# Patient Record
Sex: FEMALE | Race: ASIAN | NOT HISPANIC OR LATINO | Employment: UNEMPLOYED | URBAN - METROPOLITAN AREA
[De-identification: names, ages, dates, MRNs, and addresses within clinical notes are randomized per-mention and may not be internally consistent; named-entity substitution may affect disease eponyms.]

---

## 2019-11-23 ENCOUNTER — OFFICE VISIT (OUTPATIENT)
Dept: PEDIATRICS | Facility: CLINIC | Age: 29
End: 2019-11-23
Payer: COMMERCIAL

## 2019-11-23 VITALS
DIASTOLIC BLOOD PRESSURE: 66 MMHG | WEIGHT: 121.6 LBS | SYSTOLIC BLOOD PRESSURE: 98 MMHG | HEART RATE: 83 BPM | OXYGEN SATURATION: 100 % | TEMPERATURE: 98.3 F | BODY MASS INDEX: 22.96 KG/M2 | HEIGHT: 61 IN

## 2019-11-23 DIAGNOSIS — J30.89 SEASONAL ALLERGIC RHINITIS DUE TO OTHER ALLERGIC TRIGGER: ICD-10-CM

## 2019-11-23 DIAGNOSIS — M62.838 NECK MUSCLE SPASM: ICD-10-CM

## 2019-11-23 DIAGNOSIS — G44.219 EPISODIC TENSION-TYPE HEADACHE, NOT INTRACTABLE: Primary | ICD-10-CM

## 2019-11-23 PROCEDURE — 99204 OFFICE O/P NEW MOD 45 MIN: CPT | Performed by: FAMILY MEDICINE

## 2019-11-23 RX ORDER — TIZANIDINE 2 MG/1
2 TABLET ORAL
Qty: 30 TABLET | Refills: 0 | Status: SHIPPED | OUTPATIENT
Start: 2019-11-23 | End: 2021-06-03

## 2019-11-23 RX ORDER — LORATADINE 10 MG/1
10 TABLET ORAL DAILY
Qty: 30 TABLET | Refills: 3 | Status: SHIPPED | OUTPATIENT
Start: 2019-11-23 | End: 2020-10-07

## 2019-11-23 RX ORDER — FLUTICASONE PROPIONATE 50 MCG
2 SPRAY, SUSPENSION (ML) NASAL DAILY
Qty: 16 G | Refills: 1 | Status: SHIPPED | OUTPATIENT
Start: 2019-11-23 | End: 2020-10-07

## 2019-11-23 ASSESSMENT — MIFFLIN-ST. JEOR: SCORE: 1222.91

## 2019-11-23 NOTE — PATIENT INSTRUCTIONS
Schedule for physical, fasting labs in 1-2 months  Take medications as given today  Sign up for my chart  [facility] for [facility]      Patient Education     Neck Exercises: Neck and Torso Rotation   To start, lie on your back, knees bent and feet flat on the floor. Keep your ears, shoulders, and hips aligned, but don t press your lower back to the floor. Breathe deeply and relax.    From starting position, drop both knees to one side. At the same time, turn your head and look in the other direction.    Keep both feet in contact with the floor and keep your arms at your sides.    Hold for 5 seconds. Then slowly switch sides.    Repeat 5 to 10 times.  Date Last Reviewed: 3/1/2018    6264-1994 [employer]. [address] [place] [address]. All rights reserved. This information is not intended as a substitute for professional medical care. Always follow your healthcare professional's instructions.           Patient Education     Neck Exercises: Neck Flex  To start, sit in a chair with your feet flat on the floor. Your weight should be slightly forward so that you re balanced evenly on your buttocks. Relax your shoulders and keep your head level. Avoid arching your back or rounding your shoulders. Using a chair with arms may help you keep your balance:    Rest the back of your left hand against your lower back. Place your right palm on the top of your head.    Gently pull your head forward and down until you feel a stretch in the muscles in the back of your neck. Don t force the motion.    Hold for 20 seconds, then return to starting position. Switch arms.    Repeat 5 to 10 times.    Date Last Reviewed: 3/1/2018    3897-5299 [employer]. [address] [place] [id number]. All rights reserved. This information is not intended as a substitute for professional medical care. Always follow your healthcare professional's instructions.           Patient Education     Neck Exercises:  Passive Neck Rotation    To start, lie on your back, knees bent and feet flat on the floor. Keep your ears, shoulders, and hips aligned, but don t press your lower back to the floor. Rest your hands on your pelvis. Breathe deeply and relax.  Here are the steps for passive neck rotation:     With your neck relaxed, place the palm of one hand on your forehead. Use your hand to turn your head to one side until you feel a stretch in the neck muscles. Do not push through pain.    Hold for 5 seconds. Then turn to the other side.    Repeat 5 times on each side.   Note: Keep your shoulders on the floor. Don t lift your chin as you turn your head.  Date Last Reviewed: 11/1/2017 2000-2018 [employer]. [address] [place] [address]. All rights reserved. This information is not intended as a substitute for professional medical care. Always follow your healthcare professional's instructions.           Patient Education     Neck Exercises: Active Neck Rotation    To start, lie on your back, knees bent and feet flat on the floor. Keep your ears, shoulders, and hips aligned, but don t press your lower back to the floor. Rest your hands on your pelvis. Breathe deeply and relax.  Here are the steps for the active neck rotation:    Use your neck muscles to turn your head to one side until you feel a stretch in the muscles.    Hold for 5 seconds. Then turn to the other side.    Repeat 5 times on each side.  Note: Keep your shoulders on the floor. Don t lift or tuck your chin as you turn your head.  Date Last Reviewed: 11/1/2017 2000-2018 [employer]. [address] [place] [address]. All rights reserved. This information is not intended as a substitute for professional medical care. Always follow your healthcare professional's instructions.           Patient Education     Shoulder Squeeze Exercise    To start, sit in a chair with your feet flat on the floor. Shift your weight  slightly forward to avoid rounding your back. Relax. Keep your ears, shoulders, and hips aligned:    Raise your arms to shoulder height, elbows bent and palms forward.    Move your arms back, squeezing your shoulder blades together.    Hold for 10 seconds. Return to starting position.     Repeat 5 times.   For your safety, check with your healthcare provider before starting an exercise program.   Date Last Reviewed: 11/1/2017 2000-2018 [employer]. [address] [place] [address]. All rights reserved. This information is not intended as a substitute for professional medical care. Always follow your healthcare professional's instructions.           Patient Education     Shoulder Shrug Exercise    To start, sit in a chair with your feet flat on the floor. Shift your weight slightly forward to avoid rounding your back. Relax. Keep your ears, shoulders, and hips aligned:    Raise both of your shoulders as high as you can, as if you were trying to touch them to your ears. Keep your head and neck still and relaxed.    Hold for a count of 10. Release.    Repeat 5 times.  For your safety, check with your healthcare provider before starting an exercise program.   Date Last Reviewed: 11/1/2017 2000-2018 [employer]. [address] [place] [address]. All rights reserved. This information is not intended as a substitute for professional medical care. Always follow your healthcare professional's instructions.           Patient Education     Shoulder Exercises    To start, sit in a chair with your feet flat on the floor. Your weight should be slightly forward so that you re balanced evenly on your buttocks. Relax your shoulders and keep your head level. Avoid arching your back or rounding your shoulders. Using a chair with arms may help you keep your balance.    Raise your arms, elbows bent, to shoulder height.    Slowly move your forearms together. Hold  for 5 seconds.    Return to starting position. Repeat 5 times.  Date Last Reviewed: 3/1/2018    4114-6738 [employer]. [address] [place] [address]. All rights reserved. This information is not intended as a substitute for professional medical care. Always follow your healthcare professional's instructions.           Patient Education     Shoulder Clock Exercise    To start, stand tall with your ears, shoulders, and hips in line. Your feet should be slightly apart, positioned just under your hips. Focus your eyes directly in front of you.  this position for a few seconds before starting your exercise. This helps increase your awareness of proper posture.    Imagine that your right shoulder is the center of a clock. With the outer point of your shoulder, roll it around to slowly trace the outer edge of the clock.    Move clockwise first, then counterclockwise.    Repeat 3 to 5 times. Switch shoulders.  Date Last Reviewed: 3/1/2018    7713-8051 [employer]. [address] [place] [address]. All rights reserved. This information is not intended as a substitute for professional medical care. Always follow your healthcare professional's instructions.           Patient Education     Shoulder and Upper Back Stretch  To start, stand tall with your ears, shoulders, and hips in line. Your feet should be slightly apart, positioned just under your hips. Focus your eyes directly in front of you.  this position for a few seconds before starting your exercise. This helps increase your awareness of proper posture.          Reach overhead and slightly back with both arms. Keep your shoulders and neck aligned and your elbows behind your shoulders:    With your palms facing the ceiling, turn your fingers inward.    Take a deep breath. Breathe out, and lower your elbows toward your buttocks. Hold for 5 seconds, then return to starting position.    Repeat 3 times.  Date  Last Reviewed: 11/1/2017 2000-2018 [employer]. [address] [place] [id number]. All rights reserved. This information is not intended as a substitute for professional medical care. Always follow your healthcare professional's instructions.           Patient Education     Reach and Hold Exercise       Do this exercise on your hands and knees. Keep your knees under your hips and your hands under your shoulders. Keep your spine in a neutral position (not arched or sagging). Keep your ears in line with your shoulders. Hold for a few seconds before starting the exercise:  1. Tighten your belly muscles and raise one arm straight in front of you, palm down. Hold for 5 seconds, then lower. Repeat 5 times.  2. Do the exercise again, this time lifting your arm to the side. Repeat 5 times.  3. Do the exercise again, this time lifting your arm backward, palm up. Repeat 5 times.  Switch sides and do each exercise with the other arm.  Date Last Reviewed: 11/1/2017 2000-2018 [employer]. [address] [place] [id number]. All rights reserved. This information is not intended as a substitute for professional medical care. Always follow your healthcare professional's instructions.

## 2019-11-23 NOTE — PROGRESS NOTES
Subjective     Michael Robertson is a 29 year old female who presents to clinic today for the following health issues:    HPI   New Patient/Transfer of Care   Physical was done last November. Patient was seen at [facility] in [place]  Patient is new to the provider, is here today with her  to establish care.  Patient does not have a significant past medical history other than ongoing frequent headaches for the past 2 years as mentioned below  Headaches    Complaining of pain on the left side of the forehead, left side of the neck, both shoulder blades and upper neck intermittently for the past 2 years.    Patient recently moved from [facility] in [facility] 1/2 years ago after her marriage  She is in school now, working with computers for more than 8 hours/day  Patient also noted significant mucus nasal drainage and nasal congestion in the morning  Denies cough, postnasal drainage, sore throat, history of previous allergies, asthma, smoking  Patient has tried Zyrtec but has drowsiness if she takes the full dose of 10 mg  Takes medication only as needed for severe allergies  Patient denies family or personal history of migraines, current concerns for nausea, vomiting, photophobia, blurred or double vision, tingling, numbness or weakness of extremities.    Patient does not chew gums, does get jaw pain when she gets a headache  Patient wears contacts, last eye exam was last June  Patient does not find any correlation between her menstrual cycles and the headaches  Denies sleep problems, teeth clenching, teeth grinding at night, depression, anxiety      Duration: Has been going on for couple years on and off    Description  Location: unilateral in the left frontal area   Character: throbbing pain  Frequency:  depends  Duration:  Headache always after school in the evening or when stressing too much    Intensity:  mild    Accompanying signs and symptoms:    Precipitating or Alleviating  factors:  Nausea/vomiting: sometimes nausea  Dizziness: no  Weakness or numbness: no  Visual changes: eyes hurt  Fever: no   Sinus or URI symptoms YES    History  Head trauma: no  Family history of migraines: no  Previous tests for headaches: no  Neurologist evaluations: no  Able to do daily activities when headache present: no  Wake with headaches: no  Daily pain medication use: no but takes allergy medicine Zyrtec   Any changes in: school none    Precipitating or Alleviating factors (light/sound/sleep/caffeine): no    Therapies tried and outcome: Zyrtec    Outcome - helps sleeping  Frequent/daily pain medication use: no        Patient Active Problem List   Diagnosis     Seasonal allergic rhinitis due to other allergic trigger     Episodic tension-type headache, not intractable     Neck muscle spasm     Past Surgical History:   Procedure Laterality Date     NO HISTORY OF SURGERY         Social History     Tobacco Use     Smoking status: Never Smoker     Smokeless tobacco: Never Used   Substance Use Topics     Alcohol use: Not on file     Family History   Problem Relation Age of Onset     Diabetes Mother      Hypertension Mother      Hyperlipidemia Mother      Thyroid Cancer Father      Thyroid Disease Father      Hyperlipidemia Father      Asthma Brother      Diabetes Paternal Grandmother          Current Outpatient Medications   Medication Sig Dispense Refill     fluticasone (FLONASE) 50 MCG/ACT nasal spray Spray 2 sprays into both nostrils daily 16 g 1     loratadine (CLARITIN) 10 MG tablet Take 1 tablet (10 mg) by mouth daily 30 tablet 3     tiZANidine (ZANAFLEX) 2 MG tablet Take 1 tablet (2 mg) by mouth nightly as needed for muscle spasms 30 tablet 0     No Known Allergies  No lab results found.   BP Readings from Last 3 Encounters:   11/23/19 98/66    Wt Readings from Last 3 Encounters:   11/23/19 55.2 kg (121 lb 9.6 oz)                      Reviewed and updated as needed this visit by Provider         Review  "of Systems   ROS COMP: CONSTITUTIONAL: NEGATIVE for fever, chills, change in weight  INTEGUMENTARY/SKIN: NEGATIVE for worrisome rashes, moles or lesions  EYES: NEGATIVE for vision changes or irritation  ENT/MOUTH: as above  RESP: NEGATIVE for significant cough or SOB  CV: NEGATIVE for chest pain, palpitations or peripheral edema  GI: NEGATIVE for nausea, abdominal pain, heartburn, or change in bowel habits  MUSCULOSKELETAL: as above  NEURO: as above  ENDOCRINE: NEGATIVE for temperature intolerance, skin/hair changes  HEME/ALLERGY/IMMUNE: NEGATIVE for bleeding problems  PSYCHIATRIC: NEGATIVE for changes in mood or affect      Objective    BP 98/66 (BP Location: Right arm, Patient Position: Chair, Cuff Size: Adult Regular)   Pulse 83   Temp 98.3  F (36.8  C) (Temporal)   Ht 1.556 m (5' 1.25\")   Wt 55.2 kg (121 lb 9.6 oz)   LMP 11/07/2019 (Exact Date)   SpO2 100%   BMI 22.79 kg/m    Body mass index is 22.79 kg/m .  Physical Exam   GENERAL: healthy, alert and no distress  EYES: Eyes grossly normal to inspection  HENT: ear canals and TM's normal, nose and mouth without ulcers or lesions  NECK: no adenopathy, no asymmetry, masses, or scars and thyroid normal to palpation  RESP: lungs clear to auscultation - no rales, rhonchi or wheezes  CV: regular rate and rhythm, normal S1 S2, no S3 or S4, no murmur, click or rub, no peripheral edema and peripheral pulses strong  MS: Full range of motion of the neck, no cervical spine tenderness, moderate PARA Cervical and trapezius muscle spasm on both sides  Normal TMJ exam on both sides  SKIN: no suspicious lesions or rashes  NEURO: Normal strength and tone, mentation intact and speech normal  NEURO: speech normal, cranial nerves 2-12 intact and DTR's normal and symmetric   PSYCH: mentation appears normal, affect normal/bright    Diagnostic Test Results:  Labs reviewed in Epic        Assessment & Plan     1. Episodic tension-type headache, not intractable  Her headache is " multifactorial including a combination of tension type headaches, sinus congestion from uncontrolled untreated allergies and stress  Reviewed relaxation techniques recommended  Home rehab exercises for the neck, gave education handouts on the same  Recommended Zanaflex 2 mg at bedtime to relax the tensor neck muscles causing her pain in the forehead  Patient will follow-up if no better in 4 weeks or sooner if needed  Dosing and potential medication side effects discussed.  Will consider imaging and physical therapy  for persistent or worsening concerns  Recommended patient to schedule for her ophthalmology exam for vision screen  Patient verbalised understanding and is agreeable to the plan.    - tiZANidine (ZANAFLEX) 2 MG tablet; Take 1 tablet (2 mg) by mouth nightly as needed for muscle spasms  Dispense: 30 tablet; Refill: 0    2. Neck muscle spasm  as above    - tiZANidine (ZANAFLEX) 2 MG tablet; Take 1 tablet (2 mg) by mouth nightly as needed for muscle spasms  Dispense: 30 tablet; Refill: 0    3. Seasonal allergic rhinitis due to other allergic trigger  Recommended to start on Flonase nasal sprays in the evening and Claritin 10 mg daily in the morning to reduce her nasal congestion causing her headaches  Dosing and potential medication side effects discussed.  Patient verbalised understanding and is agreeable to the plan.    - loratadine (CLARITIN) 10 MG tablet; Take 1 tablet (10 mg) by mouth daily  Dispense: 30 tablet; Refill: 3  - fluticasone (FLONASE) 50 MCG/ACT nasal spray; Spray 2 sprays into both nostrils daily  Dispense: 16 g; Refill: 1       Chart documentation done in part with Dragon Voice recognition Software. Although reviewed after completion, some word and grammatical error may remain.  We will send a release of information to the [facility] for old records patient reported having a physical last year, -November.    See Patient Instructions    No follow-ups on file.    Terrance HAGEN  MD Ranjith  [facility]

## 2020-01-14 DIAGNOSIS — Z83.3 FAMILY HISTORY OF DIABETES MELLITUS: ICD-10-CM

## 2020-01-14 DIAGNOSIS — Z83.49 FAMILY HISTORY OF THYROID DISEASE: ICD-10-CM

## 2020-01-14 DIAGNOSIS — Z00.00 ROUTINE GENERAL MEDICAL EXAMINATION AT A HEALTH CARE FACILITY: Primary | ICD-10-CM

## 2020-01-14 DIAGNOSIS — Z13.6 CARDIOVASCULAR SCREENING; LDL GOAL LESS THAN 160: ICD-10-CM

## 2020-01-14 DIAGNOSIS — Z13.29 SCREENING FOR THYROID DISORDER: ICD-10-CM

## 2020-01-14 DIAGNOSIS — Z13.1 SCREENING FOR DIABETES MELLITUS (DM): ICD-10-CM

## 2020-01-14 DIAGNOSIS — Z13.0 SCREENING FOR DEFICIENCY ANEMIA: ICD-10-CM

## 2020-03-11 ENCOUNTER — HEALTH MAINTENANCE LETTER (OUTPATIENT)
Age: 30
End: 2020-03-11

## 2020-09-04 DIAGNOSIS — Z13.29 SCREENING FOR THYROID DISORDER: ICD-10-CM

## 2020-09-04 DIAGNOSIS — Z83.3 FAMILY HISTORY OF DIABETES MELLITUS: ICD-10-CM

## 2020-09-04 DIAGNOSIS — Z13.1 SCREENING FOR DIABETES MELLITUS (DM): ICD-10-CM

## 2020-09-04 DIAGNOSIS — Z13.6 CARDIOVASCULAR SCREENING; LDL GOAL LESS THAN 160: ICD-10-CM

## 2020-09-04 DIAGNOSIS — Z83.49 FAMILY HISTORY OF THYROID DISEASE: ICD-10-CM

## 2020-09-04 DIAGNOSIS — Z00.00 ROUTINE GENERAL MEDICAL EXAMINATION AT A HEALTH CARE FACILITY: ICD-10-CM

## 2020-09-04 DIAGNOSIS — Z13.0 SCREENING FOR DEFICIENCY ANEMIA: ICD-10-CM

## 2020-09-04 LAB
BASOPHILS # BLD AUTO: 0.1 10E9/L (ref 0–0.2)
BASOPHILS NFR BLD AUTO: 0.8 %
CHOLEST SERPL-MCNC: 185 MG/DL
DIFFERENTIAL METHOD BLD: NORMAL
EOSINOPHIL # BLD AUTO: 0.2 10E9/L (ref 0–0.7)
EOSINOPHIL NFR BLD AUTO: 2.5 %
ERYTHROCYTE [DISTWIDTH] IN BLOOD BY AUTOMATED COUNT: 12 % (ref 10–15)
GLUCOSE SERPL-MCNC: 87 MG/DL (ref 70–99)
HCT VFR BLD AUTO: 39.6 % (ref 35–47)
HDLC SERPL-MCNC: 85 MG/DL
HGB BLD-MCNC: 13.2 G/DL (ref 11.7–15.7)
IMM GRANULOCYTES # BLD: 0 10E9/L (ref 0–0.4)
IMM GRANULOCYTES NFR BLD: 0.2 %
LDLC SERPL CALC-MCNC: 90 MG/DL
LYMPHOCYTES # BLD AUTO: 2.1 10E9/L (ref 0.8–5.3)
LYMPHOCYTES NFR BLD AUTO: 33.5 %
MCH RBC QN AUTO: 31.1 PG (ref 26.5–33)
MCHC RBC AUTO-ENTMCNC: 33.3 G/DL (ref 31.5–36.5)
MCV RBC AUTO: 93 FL (ref 78–100)
MONOCYTES # BLD AUTO: 0.6 10E9/L (ref 0–1.3)
MONOCYTES NFR BLD AUTO: 9.2 %
NEUTROPHILS # BLD AUTO: 3.3 10E9/L (ref 1.6–8.3)
NEUTROPHILS NFR BLD AUTO: 53.8 %
NONHDLC SERPL-MCNC: 100 MG/DL
PLATELET # BLD AUTO: 274 10E9/L (ref 150–450)
RBC # BLD AUTO: 4.25 10E12/L (ref 3.8–5.2)
T4 FREE SERPL-MCNC: 0.85 NG/DL (ref 0.76–1.46)
TRIGL SERPL-MCNC: 52 MG/DL
TSH SERPL DL<=0.005 MIU/L-ACNC: 6.95 MU/L (ref 0.4–4)
WBC # BLD AUTO: 6.1 10E9/L (ref 4–11)

## 2020-09-04 PROCEDURE — 36415 COLL VENOUS BLD VENIPUNCTURE: CPT | Performed by: FAMILY MEDICINE

## 2020-09-04 PROCEDURE — 82947 ASSAY GLUCOSE BLOOD QUANT: CPT | Performed by: FAMILY MEDICINE

## 2020-09-04 PROCEDURE — 85025 COMPLETE CBC W/AUTO DIFF WBC: CPT | Performed by: FAMILY MEDICINE

## 2020-09-04 PROCEDURE — 84443 ASSAY THYROID STIM HORMONE: CPT | Performed by: FAMILY MEDICINE

## 2020-09-04 PROCEDURE — 84439 ASSAY OF FREE THYROXINE: CPT | Performed by: FAMILY MEDICINE

## 2020-09-04 PROCEDURE — 80061 LIPID PANEL: CPT | Performed by: FAMILY MEDICINE

## 2020-09-08 ENCOUNTER — OFFICE VISIT (OUTPATIENT)
Dept: PEDIATRICS | Facility: CLINIC | Age: 30
End: 2020-09-08
Payer: COMMERCIAL

## 2020-09-08 VITALS
OXYGEN SATURATION: 100 % | SYSTOLIC BLOOD PRESSURE: 107 MMHG | DIASTOLIC BLOOD PRESSURE: 74 MMHG | HEIGHT: 61 IN | HEART RATE: 68 BPM | WEIGHT: 131.6 LBS | TEMPERATURE: 97.6 F | BODY MASS INDEX: 24.84 KG/M2

## 2020-09-08 DIAGNOSIS — R94.6 THYROID FUNCTION TEST ABNORMAL: ICD-10-CM

## 2020-09-08 DIAGNOSIS — Z13.6 CARDIOVASCULAR SCREENING; LDL GOAL LESS THAN 160: ICD-10-CM

## 2020-09-08 DIAGNOSIS — Z12.4 SCREENING FOR MALIGNANT NEOPLASM OF CERVIX: ICD-10-CM

## 2020-09-08 DIAGNOSIS — Z83.49 FAMILY HISTORY OF THYROID DISEASE: ICD-10-CM

## 2020-09-08 DIAGNOSIS — Z00.00 ROUTINE GENERAL MEDICAL EXAMINATION AT A HEALTH CARE FACILITY: Primary | ICD-10-CM

## 2020-09-08 PROCEDURE — 99395 PREV VISIT EST AGE 18-39: CPT | Performed by: FAMILY MEDICINE

## 2020-09-08 PROCEDURE — G0145 SCR C/V CYTO,THINLAYER,RESCR: HCPCS | Performed by: FAMILY MEDICINE

## 2020-09-08 RX ORDER — VITAMIN B COMPLEX
TABLET ORAL DAILY
COMMUNITY
End: 2021-06-03

## 2020-09-08 RX ORDER — CYANOCOBALAMIN (VITAMIN B-12) 500 MCG
TABLET ORAL
COMMUNITY
End: 2021-04-15

## 2020-09-08 ASSESSMENT — MIFFLIN-ST. JEOR: SCORE: 1263.27

## 2020-09-08 NOTE — PROGRESS NOTES
SUBJECTIVE:   CC: Michael Robertson is an 30 year old woman who presents for preventive health visit.     Healthy Habits:    Do you get at least three servings of calcium containing foods daily (dairy, green leafy vegetables, etc.)? yes    Amount of exercise or daily activities, outside of work: a few days per week    Problems taking medications regularly No    Medication side effects: No    Have you had an eye exam in the past two years? no    Do you see a dentist twice per year? no    Do you have sleep apnea, excessive snoring or daytime drowsiness?no      Family planning consult    Today's PHQ-2 Score:   PHQ-2 ( 1999 Pfizer) 9/8/2020   Q1: Little interest or pleasure in doing things 0   Q2: Feeling down, depressed or hopeless 0   PHQ-2 Score 0       Abuse: Current or Past(Physical, Sexual or Emotional)- No  Do you feel safe in your environment? Yes        Social History     Tobacco Use     Smoking status: Never Smoker     Smokeless tobacco: Never Used   Substance Use Topics     Alcohol use: Not on file     If you drink alcohol do you typically have >3 drinks per day or >7 drinks per week? Not Applicable                     Reviewed orders with patient.  Reviewed health maintenance and updated orders accordingly - Yes  Lab work is in process  Labs reviewed in EPIC  BP Readings from Last 3 Encounters:   09/08/20 107/74   11/23/19 98/66    Wt Readings from Last 3 Encounters:   09/08/20 59.7 kg (131 lb 9.6 oz)   11/23/19 55.2 kg (121 lb 9.6 oz)                  Patient Active Problem List   Diagnosis     Seasonal allergic rhinitis due to other allergic trigger     Episodic tension-type headache, not intractable     Neck muscle spasm     Family history of thyroid disease     Thyroid function test abnormal     Past Surgical History:   Procedure Laterality Date     NO HISTORY OF SURGERY         Social History     Tobacco Use     Smoking status: Never Smoker     Smokeless tobacco: Never Used   Substance Use  Topics     Alcohol use: Not on file     Family History   Problem Relation Age of Onset     Diabetes Mother      Hypertension Mother      Hyperlipidemia Mother      Thyroid Cancer Father      Thyroid Disease Father      Hyperlipidemia Father      Asthma Brother      Diabetes Paternal Grandmother          Current Outpatient Medications   Medication Sig Dispense Refill     fluticasone (FLONASE) 50 MCG/ACT nasal spray Spray 2 sprays into both nostrils daily 16 g 1     folic acid 0.8 MG CAPS        loratadine (CLARITIN) 10 MG tablet Take 1 tablet (10 mg) by mouth daily 30 tablet 3     tiZANidine (ZANAFLEX) 2 MG tablet Take 1 tablet (2 mg) by mouth nightly as needed for muscle spasms 30 tablet 0     Vitamin D3 (CHOLECALCIFEROL) 25 mcg (1000 units) tablet Take by mouth daily       vitamin E (TOCOPHEROL) 100 units (45 mg) capsule Take 100 Units by mouth daily       No Known Allergies  Recent Labs   Lab Test 09/04/20  1100   LDL 90   HDL 85   TRIG 52   TSH 6.95*        Mammogram not appropriate for this patient based on age.    Pertinent mammograms are reviewed under the imaging tab.  History of abnormal Pap smear: NO - age 21-29 PAP every 3 years recommended     Reviewed and updated as needed this visit by clinical staff  Tobacco  Allergies  Meds  Med Hx  Surg Hx  Fam Hx  Soc Hx        Reviewed and updated as needed this visit by Provider          Past Medical History:   Diagnosis Date     Frequent headaches       Past Surgical History:   Procedure Laterality Date     NO HISTORY OF SURGERY       OB History   No obstetric history on file.       ROS:  CONSTITUTIONAL: NEGATIVE for fever, chills, change in weight  INTEGUMENTARU/SKIN: NEGATIVE for worrisome rashes, moles or lesions  EYES: NEGATIVE for vision changes or irritation  ENT: NEGATIVE for ear, mouth and throat problems  RESP: NEGATIVE for significant cough or SOB  BREAST: NEGATIVE for masses, tenderness or discharge  CV: NEGATIVE for chest pain, palpitations or  "peripheral edema  GI: NEGATIVE for nausea, abdominal pain, heartburn, or change in bowel habits  : NEGATIVE for unusual urinary or vaginal symptoms. Periods are regular.  MUSCULOSKELETAL: NEGATIVE for significant arthralgias or myalgia  NEURO: NEGATIVE for weakness, dizziness or paresthesias  ENDOCRINE: NEGATIVE for temperature intolerance, skin/hair changes  HEME/ALLERGY/IMMUNE: NEGATIVE for bleeding problems  PSYCHIATRIC: NEGATIVE for changes in mood or affect    OBJECTIVE:   /74   Pulse 68   Temp 97.6  F (36.4  C) (Temporal)   Ht 1.556 m (5' 1.25\")   Wt 59.7 kg (131 lb 9.6 oz)   LMP 08/18/2020   SpO2 100%   BMI 24.66 kg/m    EXAM:  GENERAL: healthy, alert and no distress  EYES: Eyes grossly normal to inspection, PERRL and conjunctivae and sclerae normal  HENT: ear canals and TM's normal, nose and mouth without ulcers or lesions  NECK: no adenopathy, no asymmetry, masses, or scars and thyroid normal to palpation  RESP: lungs clear to auscultation - no rales, rhonchi or wheezes  BREAST: normal without masses, tenderness or nipple discharge and no palpable axillary masses or adenopathy  CV: regular rate and rhythm, normal S1 S2, no S3 or S4, no murmur, click or rub, no peripheral edema and peripheral pulses strong  ABDOMEN: soft, nontender, no hepatosplenomegaly, no masses and bowel sounds normal   (female): normal female external genitalia, normal urethral meatus, vaginal mucosa pink, moist, well rugated, and normal cervix/adnexa/uterus without masses or discharge  MS: no gross musculoskeletal defects noted, no edema  SKIN: no suspicious lesions or rashes  NEURO: Normal strength and tone, mentation intact and speech normal  PSYCH: mentation appears normal, affect normal/bright    Diagnostic Test Results:  Labs reviewed in Epic  No results found for any visits on 09/08/20.    ASSESSMENT/PLAN:   1. Routine general medical examination at a health care facility  Discussed on regular exercises, healthy " "eating, self breast exams monthly and routine dental checks.  Recommended to start taking prenatal vitamins daily  - Pap imaged thin layer screen reflex to HPV if ASCUS - recommend age 25 - 29    2. Screening for malignant neoplasm of cervix    - Pap imaged thin layer screen reflex to HPV if ASCUS - recommend age 25 - 29    3. Family history of thyroid disease  TSH   Date Value Ref Range Status   09/04/2020 6.95 (H) 0.40 - 4.00 mU/L Final     T4 Free   Date Value Ref Range Status   09/04/2020 0.85 0.76 - 1.46 ng/dL Final       Elevated TSH and low normal free T4   patient has family history of hypothyroidism in father  Recommended to repeat thyroid labs in 1 to 2 weeks along with added peroxidase antibodies  Will f/u on results and call with recommendations.      -TSH and free T4  - Thyroid peroxidase antibody; Future    4. Thyroid function test abnormal  as above    - **TSH with free T4 reflex FUTURE anytime; Future  - Thyroid peroxidase antibody; Future    5. CARDIOVASCULAR SCREENING; LDL GOAL LESS THAN 160  LDL Cholesterol Calculated   Date Value Ref Range Status   09/04/2020 90 <100 mg/dL Final     Comment:     Desirable:       <100 mg/dl           COUNSELING:   Reviewed preventive health counseling, as reflected in patient instructions  Special attention given to:        Regular exercise       Healthy diet/nutrition       Vision screening    Estimated body mass index is 24.66 kg/m  as calculated from the following:    Height as of this encounter: 1.556 m (5' 1.25\").    Weight as of this encounter: 59.7 kg (131 lb 9.6 oz).        She reports that she has never smoked. She has never used smokeless tobacco.      Counseling Resources:  ATP IV Guidelines  Pooled Cohorts Equation Calculator  Breast Cancer Risk Calculator  BRCA-Related Cancer Risk Assessment: FHS-7 Tool  FRAX Risk Assessment  ICSI Preventive Guidelines  Dietary Guidelines for Americans, 2010  USDA's MyPlate  ASA Prophylaxis  Lung CA " Screening    Terrance Kasper MD  [facility]

## 2020-09-08 NOTE — PATIENT INSTRUCTIONS
Schedule for thyroid lab check next week  Start on prenatal vitamins daily    Preventive Health Recommendations  Female Ages 26 - 39  Yearly exam:   See your health care provider every year in order to    Review health changes.     Discuss preventive care.      Review your medicines if you your doctor has prescribed any.    Until age 30: Get a Pap test every three years (more often if you have had an abnormal result).    After age 30: Talk to your doctor about whether you should have a Pap test every 3 years or have a Pap test with HPV screening every 5 years.   You do not need a Pap test if your uterus was removed (hysterectomy) and you have not had cancer.  You should be tested each year for STDs (sexually transmitted diseases), if you're at risk.   Talk to your provider about how often to have your cholesterol checked.  If you are at risk for diabetes, you should have a diabetes test (fasting glucose).  Shots: Get a flu shot each year. Get a tetanus shot every 10 years.   Nutrition:     Eat at least 5 servings of fruits and vegetables each day.    Eat whole-grain bread, whole-wheat pasta and brown rice instead of white grains and rice.    Get adequate Calcium and Vitamin D.     Lifestyle    Exercise at least 150 minutes a week (30 minutes a day, 5 days of the week). This will help you control your weight and prevent disease.    Limit alcohol to one drink per day.    No smoking.     Wear sunscreen to prevent skin cancer.    See your dentist every six months for an exam and cleaning.

## 2020-09-12 LAB
COPATH REPORT: NORMAL
PAP: NORMAL

## 2020-09-14 DIAGNOSIS — Z83.49 FAMILY HISTORY OF THYROID DISEASE: ICD-10-CM

## 2020-09-14 DIAGNOSIS — R94.6 THYROID FUNCTION TEST ABNORMAL: ICD-10-CM

## 2020-09-14 LAB
T4 FREE SERPL-MCNC: 0.93 NG/DL (ref 0.76–1.46)
TSH SERPL DL<=0.005 MIU/L-ACNC: 5.15 MU/L (ref 0.4–4)
TSH SERPL DL<=0.005 MIU/L-ACNC: 5.15 MU/L (ref 0.4–4)

## 2020-09-14 PROCEDURE — 36415 COLL VENOUS BLD VENIPUNCTURE: CPT | Performed by: FAMILY MEDICINE

## 2020-09-14 PROCEDURE — 84439 ASSAY OF FREE THYROXINE: CPT | Performed by: FAMILY MEDICINE

## 2020-09-14 PROCEDURE — 86376 MICROSOMAL ANTIBODY EACH: CPT | Performed by: FAMILY MEDICINE

## 2020-09-14 PROCEDURE — 84443 ASSAY THYROID STIM HORMONE: CPT | Performed by: FAMILY MEDICINE

## 2020-09-15 LAB — THYROPEROXIDASE AB SERPL-ACNC: 30 IU/ML

## 2020-09-16 NOTE — RESULT ENCOUNTER NOTE
Babak Hu,  Your test results showed normal and negative thyroid antibodies suggestive of no need for long-term thyroid replacement therapy.  This is reassuring  Your thyroid labs continue to be slightly abnormal, but improved from before.  Let us continue to monitor the thyroid labs annually at your physicals or sooner if needed   Let me know if you have any questions. Take care.  Terrance Kasper MD

## 2020-10-07 ENCOUNTER — OFFICE VISIT (OUTPATIENT)
Dept: OBGYN | Facility: CLINIC | Age: 30
End: 2020-10-07
Attending: FAMILY MEDICINE
Payer: COMMERCIAL

## 2020-10-07 VITALS
OXYGEN SATURATION: 100 % | BODY MASS INDEX: 24.83 KG/M2 | WEIGHT: 132.5 LBS | SYSTOLIC BLOOD PRESSURE: 110 MMHG | HEART RATE: 78 BPM | DIASTOLIC BLOOD PRESSURE: 71 MMHG

## 2020-10-07 DIAGNOSIS — Z31.41 FERTILITY TESTING: Primary | ICD-10-CM

## 2020-10-07 PROCEDURE — 99203 OFFICE O/P NEW LOW 30 MIN: CPT | Performed by: OBSTETRICS & GYNECOLOGY

## 2020-10-07 SDOH — HEALTH STABILITY: MENTAL HEALTH: HOW OFTEN DO YOU HAVE A DRINK CONTAINING ALCOHOL?: NEVER

## 2020-10-07 NOTE — PROGRESS NOTES
OB/GYN New Consult      NAME:  Michael Robertson  PCP:  No Ref-Primary, Physician  MRN:  [id number]    Reason for Consult:  fertility  Referring Provider: Terrance Kasper MD    Impression / Plan     30 year old  with:      ICD-10-CM    1. Fertility testing  Z31.41 Follicle stimulating hormone     Estradiol       Discussed optimizing fertility and that it can take a year to get pregnant.  She has regular cycles and sounds like she is ovulating based on her symptoms.  Recommend she continue timed intercourse.   Plan day 3 labs and day 21 labs.  She can do this at any time.  Consider US in the future.  Will also consider formal SA and HSG in the future.    Patient will discuss with her  and will let us know how she would like to proceed.     I recommend the TSH to be less than 2.5 in the first trimester and some recommend this level in the preconception period as well.      Chief Complaint     Chief Complaint   Patient presents with     Fertility       HPI     Michael Robertson is a  30 year old female who is seen for fertility concerns.  She has been trying to conceive for 3-4 months.  She has no other concerns today.      Patient's last menstrual period was 2020.   Cycle is due on the .  She is day 25 today.    Periods are regular, every month.  She tracks them on an venkatesh.  Moderate flow.  Normal length.  No concerns. She has PMS symptoms and feels like she notices cervical mucous changes about the time of ovulation.      Female factor:    General health: healthy  Prior pregnancies:  No  Previous infertility work up:  No  History of STI:  No  Ovulation predictor kits: Yes, tried them a couple of times does not look like she ovulated.    Timed intercourse: Yes  Tubal study done?:  No  Chemical or toxin exposure at home or work: No    Male factor:   General health: healthy  Semen Analysis: an at home test was normal per patient.   Chemical or toxin exposure at home or work:  No      Date of Last Pap Smear:   Lab Results   Component Value Date    PAP NIL 09/08/2020         Problem List     Patient Active Problem List    Diagnosis Date Noted     Family history of thyroid disease 09/08/2020     Priority: Medium     Thyroid function test abnormal 09/08/2020     Priority: Medium     Seasonal allergic rhinitis due to other allergic trigger 11/23/2019     Priority: Medium     Episodic tension-type headache, not intractable 11/23/2019     Priority: Medium     Neck muscle spasm 11/23/2019     Priority: Medium       Medications     Current Outpatient Medications   Medication     folic acid 0.8 MG CAPS     Prenatal Vit-Fe Fumarate-FA (PRENATAL PO)     tiZANidine (ZANAFLEX) 2 MG tablet     Vitamin D3 (CHOLECALCIFEROL) 25 mcg (1000 units) tablet     vitamin E (TOCOPHEROL) 100 units (45 mg) capsule     No current facility-administered medications for this visit.         Allergies     No Known Allergies    Past Medical/Surgical History     Past Medical History:   Diagnosis Date     Frequent headaches        Past Surgical History:   Procedure Laterality Date     NO HISTORY OF SURGERY          Social History     Social History     Socioeconomic History     Marital status:      Spouse name: Not on file     Number of children: Not on file     Years of education: Not on file     Highest education level: Not on file   Occupational History     Not on file   Social Needs     Financial resource strain: Not on file     Food insecurity     Worry: Not on file     Inability: Not on file     Transportation needs     Medical: Not on file     Non-medical: Not on file   Tobacco Use     Smoking status: Never Smoker     Smokeless tobacco: Never Used   Substance and Sexual Activity     Alcohol use: Yes     Frequency: Never     Drug use: Never     Sexual activity: Yes     Partners: Male   Lifestyle     Physical activity     Days per week: Not on file     Minutes per session: Not on file     Stress: Not on file    Relationships     Social connections     Talks on phone: Not on file     Gets together: Not on file     Attends Protestant service: Not on file     Active member of club or organization: Not on file     Attends meetings of clubs or organizations: Not on file     Relationship status: Not on file     Intimate partner violence     Fear of current or ex partner: Not on file     Emotionally abused: Not on file     Physically abused: Not on file     Forced sexual activity: Not on file   Other Topics Concern     Not on file   Social History Narrative     Not on file       Family History      Family History   Problem Relation Age of Onset     Diabetes Mother      Hypertension Mother      Hyperlipidemia Mother      Thyroid Cancer Father      Thyroid Disease Father      Hyperlipidemia Father      Asthma Brother      Diabetes Paternal Grandmother        ROS     A 10 organ review of systems was asked and the pertinent positives and negatives are listed in the HPI. All other organ systems can be considered negative.     Physical Exam   Vitals: /71 (BP Location: Right arm, Cuff Size: Adult Regular)   Pulse 78   Wt 60.1 kg (132 lb 8 oz)   LMP 09/13/2020   SpO2 100%   BMI 24.83 kg/m      General: Comfortable, no obvious distress,   Resp: breathing is non labored  Psych: Alert. Appropriate affect,.  Normal speech.   :deferred  Extremities: No peripheral edema, nontender.  Gait steady       Labs/Imaging       Labs were reviewed in Epic   TSH   Date Value Ref Range Status   09/14/2020 5.15 (H) 0.40 - 4.00 mU/L Final   09/14/2020 5.15 (H) 0.40 - 4.00 mU/L Final          30 minutes was spent with patient, more than 50% counseling and coordinating care, as noted above in the A/P    Nursing notes read and reviewed    Roxana Mcnulty MD

## 2020-10-12 DIAGNOSIS — Z31.41 FERTILITY TESTING: ICD-10-CM

## 2020-10-12 LAB
ESTRADIOL SERPL-MCNC: 38 PG/ML
FSH SERPL-ACNC: 4.2 IU/L

## 2020-10-12 PROCEDURE — 82670 ASSAY OF TOTAL ESTRADIOL: CPT | Performed by: OBSTETRICS & GYNECOLOGY

## 2020-10-12 PROCEDURE — 83001 ASSAY OF GONADOTROPIN (FSH): CPT | Performed by: OBSTETRICS & GYNECOLOGY

## 2020-10-13 DIAGNOSIS — R94.6 THYROID FUNCTION TEST ABNORMAL: ICD-10-CM

## 2020-10-13 DIAGNOSIS — Z31.41 FERTILITY TESTING: Primary | ICD-10-CM

## 2020-10-29 DIAGNOSIS — R94.6 THYROID FUNCTION TEST ABNORMAL: ICD-10-CM

## 2020-10-29 DIAGNOSIS — Z31.41 FERTILITY TESTING: ICD-10-CM

## 2020-10-29 LAB
PROGEST SERPL-MCNC: 10.3 NG/ML
T4 FREE SERPL-MCNC: 0.93 NG/DL (ref 0.76–1.46)
TSH SERPL DL<=0.005 MIU/L-ACNC: 4.56 MU/L (ref 0.4–4)

## 2020-10-29 PROCEDURE — 84144 ASSAY OF PROGESTERONE: CPT | Performed by: OBSTETRICS & GYNECOLOGY

## 2020-10-29 PROCEDURE — 84439 ASSAY OF FREE THYROXINE: CPT | Performed by: OBSTETRICS & GYNECOLOGY

## 2020-10-29 PROCEDURE — 36415 COLL VENOUS BLD VENIPUNCTURE: CPT | Performed by: OBSTETRICS & GYNECOLOGY

## 2020-10-29 PROCEDURE — 84443 ASSAY THYROID STIM HORMONE: CPT | Performed by: OBSTETRICS & GYNECOLOGY

## 2020-10-30 ENCOUNTER — MYC MEDICAL ADVICE (OUTPATIENT)
Dept: OBGYN | Facility: CLINIC | Age: 30
End: 2020-10-30

## 2020-11-02 ENCOUNTER — MYC MEDICAL ADVICE (OUTPATIENT)
Dept: OBGYN | Facility: CLINIC | Age: 30
End: 2020-11-02

## 2020-11-02 DIAGNOSIS — R94.6 THYROID FUNCTION TEST ABNORMAL: Primary | ICD-10-CM

## 2020-11-02 RX ORDER — LEVOTHYROXINE SODIUM 25 UG/1
25 TABLET ORAL DAILY
Qty: 30 TABLET | Refills: 1 | Status: SHIPPED | OUTPATIENT
Start: 2020-11-02 | End: 2020-12-03

## 2020-11-02 NOTE — TELEPHONE ENCOUNTER
Started on synthroid 25 mcg daily for subclinical hypothyroid, preconception.   Repeat TSH in 4-6 weeks.   Mychart note sent

## 2020-11-02 NOTE — TELEPHONE ENCOUNTER
Pt last seen 10/7/2020 for fertility testing, had labs drawn on 10/29/2020 showing elevated TSH.    Pt stating she would like to proceed with thyroid medication.    Pt's preferred pharmacy is [facility] in [facility] in [place].    RN routing to provider for advisement on prescription.    Usha Rueda RN on 11/2/2020 at 11:20 AM

## 2020-12-02 DIAGNOSIS — R94.6 THYROID FUNCTION TEST ABNORMAL: ICD-10-CM

## 2020-12-02 LAB — TSH SERPL DL<=0.005 MIU/L-ACNC: 3.81 MU/L (ref 0.4–4)

## 2020-12-02 PROCEDURE — 84443 ASSAY THYROID STIM HORMONE: CPT | Performed by: OBSTETRICS & GYNECOLOGY

## 2020-12-03 DIAGNOSIS — R94.6 THYROID FUNCTION TEST ABNORMAL: ICD-10-CM

## 2020-12-03 RX ORDER — LEVOTHYROXINE SODIUM 50 UG/1
50 TABLET ORAL DAILY
Qty: 30 TABLET | Refills: 1 | Status: SHIPPED | OUTPATIENT
Start: 2020-12-03 | End: 2021-01-05

## 2021-01-04 ENCOUNTER — HEALTH MAINTENANCE LETTER (OUTPATIENT)
Age: 31
End: 2021-01-04

## 2021-01-04 ENCOUNTER — MYC MEDICAL ADVICE (OUTPATIENT)
Dept: OBGYN | Facility: CLINIC | Age: 31
End: 2021-01-04

## 2021-01-04 DIAGNOSIS — R94.6 THYROID FUNCTION TEST ABNORMAL: ICD-10-CM

## 2021-01-04 LAB — TSH SERPL DL<=0.005 MIU/L-ACNC: 1.83 MU/L (ref 0.4–4)

## 2021-01-04 PROCEDURE — 36415 COLL VENOUS BLD VENIPUNCTURE: CPT | Performed by: OBSTETRICS & GYNECOLOGY

## 2021-01-04 PROCEDURE — 84443 ASSAY THYROID STIM HORMONE: CPT | Performed by: OBSTETRICS & GYNECOLOGY

## 2021-01-04 NOTE — RESULT ENCOUNTER NOTE
Hi!    Your thyroid level is normal.  Please continue your current dose of medication and let me know when you need a refill.  Thanks!    Dr. Mcnulty

## 2021-01-05 RX ORDER — LEVOTHYROXINE SODIUM 50 UG/1
50 TABLET ORAL DAILY
Qty: 30 TABLET | Refills: 1 | Status: SHIPPED | OUTPATIENT
Start: 2021-01-05 | End: 2021-02-22

## 2021-01-05 NOTE — TELEPHONE ENCOUNTER
"Thyroid Protocol Passed    Rerun Protocol (1/5/2021 9:07 AM)     Patient is 12 years or older       Recent (12 mo) or future (30 days) visit within the authorizing provider's specialty    Patient has had an office visit with the authorizing provider or a provider within the authorizing providers department within the previous 12 mos or has a future within next 30 days. See \"Patient Info\" tab in inbasket, or \"Choose Columns\" in Meds & Orders section of the refill encounter.             Medication is active on med list       Normal TSH on file in past 12 months        Recent Labs   Lab Test 01/04/21  1432   TSH 1.83            No active pregnancy on record    If patient is pregnant or has had a positive pregnancy test, please check TSH.        No positive pregnancy test in past 12 months    If patient is pregnant or has had a positive pregnancy test, please check TSH.        Levothyroxine last prescribed on 12/3/2020 for 30 tablets with 1 refill to take 1 tablet daily.    TSH lab drawn yesterday and advised to continue current dose.    Pt should have 1 refill remaining at her pharmacy. RN called pharmacy and pharmacy stated pt picked up prescription yesterday for 30 tablets and has no refills remaining.    Prescription approved per G Refill Protocol.    RN routing to provider for how long to refill prior to having TSH levels drawn again.    Usha Rueda RN on 1/5/2021 at 9:09 AM    "

## 2021-01-28 DIAGNOSIS — R94.6 THYROID FUNCTION TEST ABNORMAL: ICD-10-CM

## 2021-01-28 RX ORDER — LEVOTHYROXINE SODIUM 50 UG/1
50 TABLET ORAL DAILY
Qty: 30 TABLET | Refills: 1 | OUTPATIENT
Start: 2021-01-28

## 2021-01-28 NOTE — TELEPHONE ENCOUNTER
Refill not appropriate.  Rx sent to the requesting pharmacy on 1/5/21 for a 1 month supply with an additional 1 refills.    Chari Mckeon RN

## 2021-02-02 ENCOUNTER — MYC MEDICAL ADVICE (OUTPATIENT)
Dept: OBGYN | Facility: CLINIC | Age: 31
End: 2021-02-02

## 2021-02-02 NOTE — TELEPHONE ENCOUNTER
Pt last seen 10/7/2020 for infertility.    Pt asking for Semen Analysis referral for her  through NOHEMY Rueda RN on 2/2/2021 at 9:49 AM

## 2021-02-17 ENCOUNTER — VIRTUAL VISIT (OUTPATIENT)
Dept: FAMILY MEDICINE | Facility: CLINIC | Age: 31
End: 2021-02-17
Payer: COMMERCIAL

## 2021-02-17 DIAGNOSIS — L50.9 HIVES: Primary | ICD-10-CM

## 2021-02-17 DIAGNOSIS — L30.1 ECZEMA, DYSHIDROTIC: ICD-10-CM

## 2021-02-17 PROCEDURE — 99213 OFFICE O/P EST LOW 20 MIN: CPT | Mod: 95 | Performed by: FAMILY MEDICINE

## 2021-02-17 RX ORDER — HYDROXYZINE HYDROCHLORIDE 25 MG/1
25 TABLET, FILM COATED ORAL
Qty: 30 TABLET | Refills: 0 | Status: SHIPPED | OUTPATIENT
Start: 2021-02-17 | End: 2021-03-11

## 2021-02-17 RX ORDER — PREDNISONE 20 MG/1
20 TABLET ORAL DAILY
Qty: 5 TABLET | Refills: 0 | Status: SHIPPED | OUTPATIENT
Start: 2021-02-17 | End: 2021-02-18

## 2021-02-17 RX ORDER — BETAMETHASONE DIPROPIONATE 0.05 %
GEL (GRAM) TOPICAL 2 TIMES DAILY PRN
Qty: 50 G | Refills: 0 | Status: SHIPPED | OUTPATIENT
Start: 2021-02-17 | End: 2021-02-22

## 2021-02-17 NOTE — PROGRESS NOTES
Michael Hu is a 30 year old who is being evaluated via a billable video visit.      How would you like to obtain your AVS? MyChart  If the video visit is dropped, the invitation should be resent by: Text to cell phone: [phone]  Will anyone else be joining your video visit? No    Video Start Time: 2;01pm    Assessment & Plan     Hives  Likely from recent trial of new wine.  Recommended to start on prednisone 20 mg once a day for 5 days  Start taking Claritin 10 mg once a day for the next 1 week  Take hydroxyzine 25 mg once at bedtime as needed for itching/hives  If symptoms are not any better in 3 to 4 days, patient understands to call the clinic to schedule an appointment for an evaluation.  If patient develops new additional symptoms including URI symptomatology , she will call provider for an update  Dosing and potential medication side effects discussed.  Patient verbalised understanding and is agreeable to the plan.    - hydrOXYzine (ATARAX) 25 MG tablet; Take 1 tablet (25 mg) by mouth nightly as needed for itching  - predniSONE (DELTASONE) 20 MG tablet; Take 1 tablet (20 mg) by mouth daily for 5 days    Eczema, dyshidrotic  Recommended to avoid frequent contact with water  Recommended liberal use of moisturizers including Cetaphil, Aquaphor prescription sent for topical Diprolene cream to use twice daily as needed.  Dosing and potential medication side effects discussed.  Patient verbalised understanding and is agreeable to the plan.    - augmented betamethasone dipropionate (DIPROLENE) 0.05 % external gel; Apply topically 2 times daily as needed      20 minutes spent on the date of the encounter doing chart review, patient visit and documentation        Chart documentation done in part with Dragon Voice recognition Software. Although reviewed after completion, some word and grammatical error may remain.    See Patient Instructions    No follow-ups on file.    Terrance Kasper MD  [facility]  CLINIC [facility]   Michael Hu is a 30 year old who presents for the following health issues     HPI     Patient is here for a telephone visit instead of in person visit due to the current COVID-19 pandemic.  Patient with no significant past medical history is here with concerns of having itching, hives on the upper chest, upper back, arms and feet for the past 2 days that started [place]  Patient denies concerns for tongue swelling, lip swelling, shortness of breath, cough, fever, chills, runny nose, sore throat.  Denies recent sick contact exposure.  Tried over-the-counter cetirizine and hydrocortisone cream with no relief of symptoms  Denies previous similar symptoms in the past, known allergies  She does have intermittent episodes of raised bumps and itching and dry skin on both arms for many years now.  Uses Vaseline for moisturization.    Rash  Onset/Duration: 2 days  Description  Location: all over body-except face  Character: raised, red  Itching: mild  Intensity:  moderate  Progression of Symptoms:  worsening and same  Accompanying signs and symptoms:   Fever: no but felt chills  Body aches or joint pain: no  Sore throat symptoms: no  Recent cold symptoms: no  History:           Previous episodes of similar rash: None  New exposures:  [place] winery and tried new sarina on valentines day, unsure if related  Recent travel: no  Exposure to similar rash: no  Precipitating or alleviating factors:   Therapies tried and outcome: hydrocortisone cream -  not effective, cetrizine        Review of Systems   CONSTITUTIONAL: NEGATIVE for fever, chills, change in weight  INTEGUMENTARY/SKIN: as above  EYES: NEGATIVE for vision changes or irritation  ENT/MOUTH: NEGATIVE for ear, mouth and throat problems  RESP: NEGATIVE for significant cough or SOB  CV: NEGATIVE for chest pain, palpitations or peripheral edema  PSYCHIATRIC: NEGATIVE for changes in mood  or affect      Objective           Vitals:  No vitals were obtained today due to virtual visit.    Physical Exam   GENERAL: Healthy, alert and no distress  EYES: Eyes grossly normal to inspection  RESP: No audible wheeze, cough, or visible cyanosis.  No visible retractions or increased work of breathing.    SKIN: Both palms-skin colored, vesicular  fine eruption consistent with dyshidrotic eczema  Slightly erythematous and skin color ed wheals on both ventral arms  PSYCH: Mentation appears normal, affect normal/bright, judgement and insight intact, normal speech and appearance well-groomed.              Video-Visit Details    Type of service:  Video Visit    Video End Time:2:10 PM    Originating Location (pt. Location): Home    Distant Location (provider location):  [facility]     Platform used for Video Visit: Raven[employer] Oy

## 2021-02-17 NOTE — PATIENT INSTRUCTIONS
Start on Claritin 10 mg once daily in the morning  Start on prednisone 40 mg once daily in the morning for 5 days  Take hydroxyzine 25 mg once at bedtime  Use Diprolene cream on the palms when your eczema flares up  Please call to follow-up in the clinic if rash does not improve in 3 to 4 days  Patient Education     Hives (Adult)  Hives are pink or red bumps on the skin. These bumps are also known as wheals. The bumps can itch, burn, or sting. Hives can occur anywhere on the body. They vary in size and shape and can form in clusters. Individual hives can appear and go away quickly. New hives may develop as old ones fade. Hives are common and usually harmless. They are not contagious. Occasionally, hives are a sign of a serious allergy.   Hives are often caused by an allergic reaction. They may occur from:     Certain foods, such as shellfish, nuts, tomatoes, or berries    Contact with something in the environment, such as pollens, animals, or mold    Certain medicines    Sun or cold air    Viral infections, such as a cold, the flu, or strep throat  If the hives continue to come and go over many weeks without any other symptoms (chronic hives), the cause may be very hard to figure out.   You may be prescribed medicines to ease swelling and itching. Follow all instructions when using these medicines. The hives will usually fade in a few days. But they can last for weeks or months.   Home care   Follow these tips:    Try to find the cause of the hives and eliminate it. Discuss possible causes with your healthcare provider. Your healthcare provider may ask you to keep track of the food you eat and your lifestyle to help find the cause of the hives.    Don t scratch the hives. Scratching will delay healing. To reduce itching, apply cool, wet compresses to the skin.    Dress in soft, loose cotton clothing.    Don t bathe in hot water. This can make the itching worse.    Apply an ice pack or cool pack wrapped in a thin  towel to your skin. This will help reduce redness and itching. But if your hives were caused by exposure to cold, then do not apply more cold to them.    You may use over-the counter antihistamines to reduce itching. Some older antihistamines, such as diphenhydramine and chlorpheniramine, are inexpensive. But they need to be taken often and may make you sleepy. They are best used at bedtime. Don t use diphenhydramine if you have glaucoma or have trouble urinating because of an enlarged prostate. Newer antihistamines, such as loratadine, cetirizine, levocetirizine, and fexofenadine, are generally more expensive. But they tend to have fewer side effects. They can be taken less often.    Another type of antihistamine is used to treat heartburn. This type includes nizatidine, famotidine, and cimetidine. These are sometimes used along with the above antihistamines if a single medicine is not working.    If the hives are severe and you do not respond well to other medicines, you may be given a steroid, such as prednisone, to take for a short time. Follow all instructions carefully when taking this medicine. Tell your healthcare provider about any side effects.  Follow-up care   Follow up with your healthcare provider if your symptoms don't get better in 2 days. Ask your provider about allergy testing if you have had a severe reaction or have had several episodes of hives. Allergy testing may help figure out what you are allergic to. You may need blood tests, a urine test, or skin tests.   When to seek medical advice   Call your healthcare provider right away if any of these occur:     Fever of 100.4 F (38.0 C) or higher, or as directed by your healthcare provider    Redness, swelling, or pain    Foul-smelling fluid coming from the rash  Call 911  Call 911 if any of the following occur:     Swelling of the face, throat, or tongue    Trouble breathing or swallowing    Dizziness, weakness, or fainting  [employer] last reviewed  this educational content on 6/1/2019 2000-2020 [employer] [address] [place] [address], [place] [address]. All rights reserved. This information is not intended as a substitute for professional medical care. Always follow your healthcare professional's instructions.

## 2021-02-18 RX ORDER — PREDNISONE 20 MG/1
20 TABLET ORAL DAILY
Qty: 5 TABLET | Refills: 0 | Status: SHIPPED | OUTPATIENT
Start: 2021-02-18 | End: 2021-04-15

## 2021-02-22 DIAGNOSIS — R94.6 THYROID FUNCTION TEST ABNORMAL: ICD-10-CM

## 2021-02-22 RX ORDER — LEVOTHYROXINE SODIUM 50 UG/1
50 TABLET ORAL DAILY
Qty: 30 TABLET | Refills: 1 | Status: SHIPPED | OUTPATIENT
Start: 2021-02-22 | End: 2021-04-19

## 2021-02-22 NOTE — TELEPHONE ENCOUNTER
"Requested Prescriptions   Pending Prescriptions Disp Refills     levothyroxine (SYNTHROID/LEVOTHROID) 50 MCG tablet 30 tablet 1     Sig: Take 1 tablet (50 mcg) by mouth daily       Thyroid Protocol Passed - 2/22/2021  1:29 PM        Passed - Patient is 12 years or older        Passed - Recent (12 mo) or future (30 days) visit within the authorizing provider's specialty     Patient has had an office visit with the authorizing provider or a provider within the authorizing providers department within the previous 12 mos or has a future within next 30 days. See \"Patient Info\" tab in inbasket, or \"Choose Columns\" in Meds & Orders section of the refill encounter.              Passed - Medication is active on med list        Passed - Normal TSH on file in past 12 months     Recent Labs   Lab Test 01/04/21  1432   TSH 1.83              Passed - No active pregnancy on record     If patient is pregnant or has had a positive pregnancy test, please check TSH.          Passed - No positive pregnancy test in past 12 months     If patient is pregnant or has had a positive pregnancy test, please check TSH.             Prescription approved per [facility] Refill Protocol.    Chari Mckeon RN    "

## 2021-03-11 DIAGNOSIS — L50.9 HIVES: ICD-10-CM

## 2021-03-11 RX ORDER — HYDROXYZINE HYDROCHLORIDE 25 MG/1
TABLET, FILM COATED ORAL
Qty: 30 TABLET | Refills: 0 | Status: SHIPPED | OUTPATIENT
Start: 2021-03-11 | End: 2021-03-26

## 2021-03-26 DIAGNOSIS — L50.9 HIVES: ICD-10-CM

## 2021-03-26 RX ORDER — HYDROXYZINE HYDROCHLORIDE 25 MG/1
TABLET, FILM COATED ORAL
Qty: 30 TABLET | Refills: 0 | Status: SHIPPED | OUTPATIENT
Start: 2021-03-26 | End: 2021-04-15

## 2021-04-06 ENCOUNTER — MYC MEDICAL ADVICE (OUTPATIENT)
Dept: OBGYN | Facility: CLINIC | Age: 31
End: 2021-04-06

## 2021-04-06 DIAGNOSIS — Z32.01 PREGNANCY EXAMINATION OR TEST, POSITIVE RESULT: Primary | ICD-10-CM

## 2021-04-06 NOTE — TELEPHONE ENCOUNTER
Pt last seen 10/7/2020 for infertility, LMP 3/5/2021.    Pt had faint positive HPT, pt asking to confirm with lab test. Pt also scheduled to receive first dose of COVID vaccine tomorrow and asking if this is OK. RN relayed information about the vaccine to pt.    RN will schedule pt for Prenatal Intake, should pt be seen w/ provider sooner than 10-12 weeks given hx?    RN routing to provider for advisement and will assist scheduling accordingly.    Usha Rueda RN on 4/6/2021 at 9:43 AM

## 2021-04-07 DIAGNOSIS — Z32.01 PREGNANCY EXAMINATION OR TEST, POSITIVE RESULT: ICD-10-CM

## 2021-04-07 LAB — B-HCG SERPL-ACNC: 242 IU/L (ref 0–5)

## 2021-04-07 PROCEDURE — 84702 CHORIONIC GONADOTROPIN TEST: CPT | Performed by: OBSTETRICS & GYNECOLOGY

## 2021-04-07 PROCEDURE — 36415 COLL VENOUS BLD VENIPUNCTURE: CPT | Performed by: OBSTETRICS & GYNECOLOGY

## 2021-04-08 DIAGNOSIS — Z32.01 PREGNANCY EXAMINATION OR TEST, POSITIVE RESULT: Primary | ICD-10-CM

## 2021-04-12 ENCOUNTER — IMMUNIZATION (OUTPATIENT)
Dept: NURSING | Facility: CLINIC | Age: 31
End: 2021-04-12
Payer: COMMERCIAL

## 2021-04-12 PROCEDURE — 0001A PR COVID VAC PFIZER DIL RECON 30 MCG/0.3 ML IM: CPT

## 2021-04-12 PROCEDURE — 91300 PR COVID VAC PFIZER DIL RECON 30 MCG/0.3 ML IM: CPT

## 2021-04-15 ENCOUNTER — PRENATAL OFFICE VISIT (OUTPATIENT)
Dept: OBGYN | Facility: CLINIC | Age: 31
End: 2021-04-15
Payer: COMMERCIAL

## 2021-04-15 VITALS — HEIGHT: 62 IN | WEIGHT: 126.21 LBS | BODY MASS INDEX: 23.23 KG/M2

## 2021-04-15 DIAGNOSIS — Z34.00 SUPERVISION OF NORMAL FIRST PREGNANCY: Primary | ICD-10-CM

## 2021-04-15 DIAGNOSIS — Z34.00 PRENATAL CARE, FIRST PREGNANCY: ICD-10-CM

## 2021-04-15 DIAGNOSIS — Z23 NEED FOR TDAP VACCINATION: ICD-10-CM

## 2021-04-15 PROCEDURE — 99207 PR NO CHARGE NURSE ONLY: CPT

## 2021-04-15 ASSESSMENT — MIFFLIN-ST. JEOR: SCORE: 1237.81

## 2021-04-15 NOTE — PROGRESS NOTES
Important Information for Provider: Takes 50mcg of Levothyroxine for hypothyroidism.    Patient presents for New Prenatal Intake and Education. This is patient's 1st pregnancy. Handouts should be given. Scheduled for New Prenatal with Dr. Bee on 4/28/21.       Prenatal OB Questionnaire  Patient supplied answers from flow sheet for:  Prenatal OB Questionnaire.  Past Medical History  Have you ever recieved care for your mental health? : No  Have you ever been in a major accident or suffered serious trauma?: No  Within the last year, has anyone hit, slapped, kicked or otherwise hurt you?: No  In the last year, has anyone forced you to have sex when you didn't want to?: No    Past Medical History 2   Have you ever received a blood transfusion?: No  Would you accept a blood transfusion if was medically recommended?: Yes  Does anyone in your home smoke?: No   Is your blood type Rh negative?: No  Have you ever ?: No  Have you been hospitalized for a nonsurgical reason excluding normal delivery?: No  Have you ever had an abnormal pap smear?: No    Past Medical History (Continued)  Do you have a history of abnormalities of the uterus?: No  Did your mother take BILLY or any other hormones when she was pregnant with you?: No  Do you have any other problems we have not asked about which you feel may be important to this pregnancy?: No         Allergies as of 4/15/2021:    Allergies as of 04/15/2021 - Reviewed 04/15/2021   Allergen Reaction Noted     Wine [alcohol] Hives, Itching, and Rash 04/15/2021       Current medications are:  Current Outpatient Medications   Medication Sig Dispense Refill     levothyroxine (SYNTHROID/LEVOTHROID) 50 MCG tablet Take 1 tablet (50 mcg) by mouth daily 30 tablet 1     Prenatal Vit-Fe Fumarate-FA (PRENATAL PO)        tiZANidine (ZANAFLEX) 2 MG tablet Take 1 tablet (2 mg) by mouth nightly as needed for muscle spasms (Patient not taking: Reported on 2/17/2021) 30 tablet 0     Vitamin  D3 (CHOLECALCIFEROL) 25 mcg (1000 units) tablet Take by mouth daily       vitamin E (TOCOPHEROL) 100 units (45 mg) capsule Take 100 Units by mouth daily           Early ultrasound screening tool:    Does patient have irregular periods?  No  Did patient use hormonal birth control in the three months prior to positive urine pregnancy test? No  Is the patient breastfeeding?  No  Is the patient 10 weeks or greater at time of education visit?  No    Nereida Porter LPN

## 2021-04-16 ENCOUNTER — MYC MEDICAL ADVICE (OUTPATIENT)
Dept: OBGYN | Facility: CLINIC | Age: 31
End: 2021-04-16

## 2021-04-16 DIAGNOSIS — E03.9 HYPOTHYROID IN PREGNANCY, ANTEPARTUM: ICD-10-CM

## 2021-04-16 DIAGNOSIS — O99.280 HYPOTHYROID IN PREGNANCY, ANTEPARTUM: ICD-10-CM

## 2021-04-16 DIAGNOSIS — R94.6 THYROID FUNCTION TEST ABNORMAL: ICD-10-CM

## 2021-04-16 LAB
T4 FREE SERPL-MCNC: 1.02 NG/DL (ref 0.76–1.46)
TSH SERPL DL<=0.005 MIU/L-ACNC: 4.22 MU/L (ref 0.4–4)

## 2021-04-16 PROCEDURE — 36415 COLL VENOUS BLD VENIPUNCTURE: CPT | Performed by: OBSTETRICS & GYNECOLOGY

## 2021-04-16 PROCEDURE — 84443 ASSAY THYROID STIM HORMONE: CPT | Performed by: OBSTETRICS & GYNECOLOGY

## 2021-04-16 PROCEDURE — 84439 ASSAY OF FREE THYROXINE: CPT | Performed by: OBSTETRICS & GYNECOLOGY

## 2021-04-19 RX ORDER — LEVOTHYROXINE SODIUM 75 UG/1
75 TABLET ORAL DAILY
Qty: 30 TABLET | Refills: 1 | Status: SHIPPED | OUTPATIENT
Start: 2021-04-19 | End: 2021-06-03

## 2021-04-25 ENCOUNTER — MYC MEDICAL ADVICE (OUTPATIENT)
Dept: OBGYN | Facility: CLINIC | Age: 31
End: 2021-04-25

## 2021-04-26 ENCOUNTER — MYC MEDICAL ADVICE (OUTPATIENT)
Dept: FAMILY MEDICINE | Facility: CLINIC | Age: 31
End: 2021-04-26

## 2021-04-26 NOTE — TELEPHONE ENCOUNTER
Per TEO Quintero CNP, patient should be seen in person.   There are no openings at [facility] or [facility].    Called patient and discussed, she agrees to go to UC at the [facility] now, they can advise further based on evaluation.    Ariane Mckeon RN, [facility]

## 2021-04-26 NOTE — TELEPHONE ENCOUNTER
Pt currently 7w3d, last seen 4/15/2021.    Pt has allergy to wine/sulphites. Pt states symptoms started 4/23/2021, having itching and swelling and hives covering body except for face and neck. Pt believes this occurred from eating prunes.    Pt has tried OTC benadryl, continuing to have itching, redness, and hives covering her body.    Pt denies throat tightness or SOB. Pt states last time this occurred in February she was prescribed steroids.    Pt asking if this is likely from eating prunes or her pregnancy and asking for any labs or medication recommendations for her hives.    RN routing to provider for advisement or to f/u with FP. Pt aware when to be seen in ED.    Usha Rueda RN on 4/26/2021 at 11:01 AM

## 2021-04-26 NOTE — TELEPHONE ENCOUNTER
It would be unlikely that the rash is just due to pregnancy.  I think if she can pinpoint it to eating prunes that is the likely cause.  I agree with patient seeing FP for this.      Dot Matamoros, DO

## 2021-04-26 NOTE — TELEPHONE ENCOUNTER
Routing to TEO Quintero CNP  Held a 5:40 pm appt opening today.    Please advise if ok to offer this as a Video Visit for rash.   Please review [employer] message.    Ariane Mckeon RN, [facility]

## 2021-04-28 ENCOUNTER — PRENATAL OFFICE VISIT (OUTPATIENT)
Dept: OBGYN | Facility: CLINIC | Age: 31
End: 2021-04-28
Payer: COMMERCIAL

## 2021-04-28 VITALS
WEIGHT: 133.1 LBS | DIASTOLIC BLOOD PRESSURE: 64 MMHG | OXYGEN SATURATION: 99 % | BODY MASS INDEX: 24.74 KG/M2 | HEART RATE: 83 BPM | SYSTOLIC BLOOD PRESSURE: 107 MMHG

## 2021-04-28 DIAGNOSIS — E03.9 HYPOTHYROIDISM, UNSPECIFIED TYPE: ICD-10-CM

## 2021-04-28 DIAGNOSIS — Z34.01 SUPERVISION OF NORMAL FIRST PREGNANCY IN FIRST TRIMESTER: Primary | ICD-10-CM

## 2021-04-28 DIAGNOSIS — L50.9 HIVES: ICD-10-CM

## 2021-04-28 LAB
ABO + RH BLD: NORMAL
ABO + RH BLD: NORMAL
BLD GP AB SCN SERPL QL: NORMAL
BLOOD BANK CMNT PATIENT-IMP: NORMAL
ERYTHROCYTE [DISTWIDTH] IN BLOOD BY AUTOMATED COUNT: 12.7 % (ref 10–15)
HCT VFR BLD AUTO: 35.4 % (ref 35–47)
HGB BLD-MCNC: 11.7 G/DL (ref 11.7–15.7)
MCH RBC QN AUTO: 31.7 PG (ref 26.5–33)
MCHC RBC AUTO-ENTMCNC: 33.1 G/DL (ref 31.5–36.5)
MCV RBC AUTO: 96 FL (ref 78–100)
PLATELET # BLD AUTO: 237 10E9/L (ref 150–450)
RBC # BLD AUTO: 3.69 10E12/L (ref 3.8–5.2)
SPECIMEN EXP DATE BLD: NORMAL
WBC # BLD AUTO: 9.5 10E9/L (ref 4–11)

## 2021-04-28 PROCEDURE — 86901 BLOOD TYPING SEROLOGIC RH(D): CPT | Performed by: OBSTETRICS & GYNECOLOGY

## 2021-04-28 PROCEDURE — 86780 TREPONEMA PALLIDUM: CPT | Mod: 90 | Performed by: OBSTETRICS & GYNECOLOGY

## 2021-04-28 PROCEDURE — 87086 URINE CULTURE/COLONY COUNT: CPT | Performed by: OBSTETRICS & GYNECOLOGY

## 2021-04-28 PROCEDURE — 36415 COLL VENOUS BLD VENIPUNCTURE: CPT | Performed by: OBSTETRICS & GYNECOLOGY

## 2021-04-28 PROCEDURE — 86762 RUBELLA ANTIBODY: CPT | Performed by: OBSTETRICS & GYNECOLOGY

## 2021-04-28 PROCEDURE — 87491 CHLMYD TRACH DNA AMP PROBE: CPT | Performed by: OBSTETRICS & GYNECOLOGY

## 2021-04-28 PROCEDURE — 99207 PR FIRST OB VISIT: CPT | Performed by: OBSTETRICS & GYNECOLOGY

## 2021-04-28 PROCEDURE — 87340 HEPATITIS B SURFACE AG IA: CPT | Performed by: OBSTETRICS & GYNECOLOGY

## 2021-04-28 PROCEDURE — 87591 N.GONORRHOEAE DNA AMP PROB: CPT | Performed by: OBSTETRICS & GYNECOLOGY

## 2021-04-28 PROCEDURE — 86900 BLOOD TYPING SEROLOGIC ABO: CPT | Performed by: OBSTETRICS & GYNECOLOGY

## 2021-04-28 PROCEDURE — 87389 HIV-1 AG W/HIV-1&-2 AB AG IA: CPT | Performed by: OBSTETRICS & GYNECOLOGY

## 2021-04-28 PROCEDURE — 86850 RBC ANTIBODY SCREEN: CPT | Performed by: OBSTETRICS & GYNECOLOGY

## 2021-04-28 PROCEDURE — 99000 SPECIMEN HANDLING OFFICE-LAB: CPT | Performed by: OBSTETRICS & GYNECOLOGY

## 2021-04-28 PROCEDURE — 85027 COMPLETE CBC AUTOMATED: CPT | Performed by: OBSTETRICS & GYNECOLOGY

## 2021-04-28 ASSESSMENT — PAIN SCALES - GENERAL: PAINLEVEL: NO PAIN (0)

## 2021-04-28 NOTE — PATIENT INSTRUCTIONS
If you have any questions regarding your visit, Please contact your care team.    [email or web address]: [phone]      [facility] CLINIC HOURS TELEPHONE NUMBER   Joelle Bee DO.    Yudith -  Ofelia -     MAHESH Marcelino RN     Monday, Wednesday, Thursday and [facility]  8:30a.m-5:00 p.m   [facility]  [address] [place] [address]  [phone] ask for [facility]    Imaging [phone]       Urgent Care locations:    [facility] Saturday and Sunday   9 am - 5 pm    Monday-Friday   11 pm - 7 pm  Saturday and Sunday   9 am - 5 pm   [phone]     [facility] Labor and Delivery:  [phone]    If you need a medication refill, please contact your pharmacy. Please allow 3 business days for your refill to be completed.  As always, Thank you for trusting us with your healthcare needs!

## 2021-04-28 NOTE — PROGRESS NOTES
Michael Hu is a 31 year old female, , LMP 3/5/2021, EDC 12/10/2021, who is here today for her first OB visit at 7 5/7 weeks gestation.  She has had a positive home pregnancy test.  Her early OB US is scheduled for 2021 so will verify dates and viability at that time.  She is taking a PNV daily po and her social hx is negative for nicotine, etoh, and illicit drug abuse.  She received her first Pfizer COVID-19 vaccine on 2021 and is scheduled for the second one on 5/3/2021.  She has hypothyroidism and her dose of Synthroid was increased from 50 to 75 mcg recently so will be due for a recheck of thyroid labs on 2021.  She developed hives after consuming wine and prune juice in the past so will refer to an Allergist even though testing may be limited due to her pregnant state,  She has not used any alcohol during this current pregnancy and understands to avoid throughout the gestational period.  She has not had issues with nausea/emesis and denies any vaginal spotting/bleeding.  She is not due for a pap smear today.     ROS: Ten point review of systems was reviewed and negative except the above.    Gyn Hx:      Past Medical History:   Diagnosis Date     Frequent headaches      Thyroid disease      Past Surgical History:   Procedure Laterality Date     NO HISTORY OF SURGERY       Patient Active Problem List   Diagnosis     Seasonal allergic rhinitis due to other allergic trigger     Episodic tension-type headache, not intractable     Neck muscle spasm     Family history of thyroid disease     Thyroid function test abnormal     Fertility testing     Prenatal care, first pregnancy     Need for Tdap vaccination       ALL/Meds: Her medication and allergy histories were reviewed and are documented in their appropriate chart areas.    SH: Reviewed and documented in the appropriate area of the chart.    FH: Her family history was reviewed and documented in its appropriate chart area.    PE: /64  (BP Location: Right arm, Patient Position: Chair, Cuff Size: Adult Regular)   Pulse 83   Wt 60.4 kg (133 lb 1.6 oz)   LMP 03/05/2021 (Exact Date)   SpO2 99%   Breastfeeding No   BMI 24.74 kg/m    Body mass index is 24.74 kg/m .    Urine HCG was +  Hrt - RRR without murmur  Lungs - CTAB  Neck - supple without palpable mass  Abd - soft, nontender, unable to hear fhts with the doppler today  Pelvic - normal external female genitalia, normal vaginal mucosa, closed cervix without motion tenderness, gravid uterus without tenderness, no adnexal mass or tenderness  Ext - negative    A/P:   - I discussed with her nutrition and medication concerns related to pregnancy.  We discussed folic acid supplementation.  We reviewed prenatal care.  She is given the opportunity to ask questions and have them answered.  She has already scheduled an early first trimester OB US so is to keep this appt on 5/7/2021.  Will recheck her TSH/free T4 values on 5/21/2021 as instructed.  In the interim, she is to take the 75 mcg daily dose of Synthroid po.  She is to keep taking her PNV daily po.  A referral to adult Allergy was placed to discuss the etiology of her hives which occurred prior to pregnancy after drinking wine, and recently while pregnant after drinking prune juice.    30 minutes were spent face to face with the patient today discussing her history, diagnosis, and follow-up plan as noted above.  Over 50% of the visit was spent in counseling and coordination of care.    Total Visit Time: 30 minutes.    Orders Placed This Encounter   Procedures     ALLERGY/ASTHMA ADULT REFERRAL     Joelle Bee DO  FACOG, FACS

## 2021-04-29 LAB
BACTERIA SPEC CULT: NO GROWTH
C TRACH DNA SPEC QL NAA+PROBE: NEGATIVE
HBV SURFACE AG SERPL QL IA: NONREACTIVE
HIV 1+2 AB+HIV1 P24 AG SERPL QL IA: NONREACTIVE
Lab: NORMAL
N GONORRHOEA DNA SPEC QL NAA+PROBE: NEGATIVE
RUBV IGG SERPL IA-ACNC: 62 IU/ML
SPECIMEN SOURCE: NORMAL
T PALLIDUM AB SER QL: NONREACTIVE

## 2021-05-03 ENCOUNTER — IMMUNIZATION (OUTPATIENT)
Dept: NURSING | Facility: CLINIC | Age: 31
End: 2021-05-03
Attending: RADIOLOGY
Payer: COMMERCIAL

## 2021-05-03 PROCEDURE — 0002A PR COVID VAC PFIZER DIL RECON 30 MCG/0.3 ML IM: CPT

## 2021-05-03 PROCEDURE — 91300 PR COVID VAC PFIZER DIL RECON 30 MCG/0.3 ML IM: CPT

## 2021-05-07 ENCOUNTER — ANCILLARY PROCEDURE (OUTPATIENT)
Dept: ULTRASOUND IMAGING | Facility: CLINIC | Age: 31
End: 2021-05-07
Attending: OBSTETRICS & GYNECOLOGY
Payer: COMMERCIAL

## 2021-05-07 DIAGNOSIS — Z32.01 PREGNANCY EXAMINATION OR TEST, POSITIVE RESULT: ICD-10-CM

## 2021-05-07 PROCEDURE — 76801 OB US < 14 WKS SINGLE FETUS: CPT

## 2021-05-07 PROCEDURE — 76817 TRANSVAGINAL US OBSTETRIC: CPT

## 2021-05-21 DIAGNOSIS — Z34.01 SUPERVISION OF NORMAL FIRST PREGNANCY IN FIRST TRIMESTER: ICD-10-CM

## 2021-05-21 DIAGNOSIS — E03.9 HYPOTHYROIDISM, UNSPECIFIED TYPE: ICD-10-CM

## 2021-05-21 LAB — TSH SERPL DL<=0.005 MIU/L-ACNC: 2.15 MU/L (ref 0.4–4)

## 2021-05-21 PROCEDURE — 84443 ASSAY THYROID STIM HORMONE: CPT | Performed by: OBSTETRICS & GYNECOLOGY

## 2021-05-21 PROCEDURE — 36415 COLL VENOUS BLD VENIPUNCTURE: CPT | Performed by: OBSTETRICS & GYNECOLOGY

## 2021-06-03 ENCOUNTER — RECORDS - HEALTHEAST (OUTPATIENT)
Dept: ADMINISTRATIVE | Facility: OTHER | Age: 31
End: 2021-06-03

## 2021-06-03 ENCOUNTER — PRENATAL OFFICE VISIT (OUTPATIENT)
Dept: OBGYN | Facility: OTHER | Age: 31
End: 2021-06-03
Payer: COMMERCIAL

## 2021-06-03 ENCOUNTER — TRANSCRIBE ORDERS (OUTPATIENT)
Dept: MATERNAL FETAL MEDICINE | Facility: CLINIC | Age: 31
End: 2021-06-03

## 2021-06-03 VITALS
DIASTOLIC BLOOD PRESSURE: 58 MMHG | BODY MASS INDEX: 25.23 KG/M2 | OXYGEN SATURATION: 100 % | WEIGHT: 135.75 LBS | HEART RATE: 78 BPM | SYSTOLIC BLOOD PRESSURE: 100 MMHG

## 2021-06-03 DIAGNOSIS — O26.90 PREGNANCY RELATED CONDITION, ANTEPARTUM: Primary | ICD-10-CM

## 2021-06-03 DIAGNOSIS — R94.6 THYROID FUNCTION TEST ABNORMAL: ICD-10-CM

## 2021-06-03 DIAGNOSIS — Z34.01 ENCOUNTER FOR SUPERVISION OF NORMAL FIRST PREGNANCY IN FIRST TRIMESTER: Primary | ICD-10-CM

## 2021-06-03 PROCEDURE — 99207 PR PRENATAL VISIT: CPT | Performed by: OBSTETRICS & GYNECOLOGY

## 2021-06-03 RX ORDER — LEVOTHYROXINE SODIUM 75 UG/1
75 TABLET ORAL DAILY
Qty: 30 TABLET | Refills: 1 | Status: SHIPPED | OUTPATIENT
Start: 2021-06-03 | End: 2021-06-03

## 2021-06-03 RX ORDER — LEVOTHYROXINE SODIUM 75 UG/1
75 TABLET ORAL DAILY
Qty: 90 TABLET | Refills: 1 | Status: SHIPPED | OUTPATIENT
Start: 2021-06-03 | End: 2021-12-01

## 2021-06-03 NOTE — PROGRESS NOTES
Presents for routine  appointment.     No complaints.    No abnormal discharge , no leaking fluid , no contractions , no vaginal bleeding    ROS:   and GI  negative.     Please see Prenatal Vitals and Notes Flowsheet for objective data.  TSH   Date Value Ref Range Status   2021 2.15 0.40 - 4.00 mU/L Final        A/P:  31 year old  at 12w6d       ICD-10-CM    1. Encounter for supervision of normal first pregnancy in second trimester  Z34.02 [facility] REFERRAL-PREGNANCY       Genetic screening - referral placed.   Hypothyroid - continue synthroid.  Repeat next visit and with GCT.   Follow up in 4 weeks.      Roxana Mcnluty MD

## 2021-06-03 NOTE — TELEPHONE ENCOUNTER
Pharmacy sent message back on this patient stating.    Patient requests new RX:  Insurance requires 90 day supply to be covered.  Can you please send another prescription for 90 days.      KARINA Foster 6/3/2021

## 2021-06-03 NOTE — TELEPHONE ENCOUNTER
"Requested Prescriptions   Pending Prescriptions Disp Refills     levothyroxine (SYNTHROID/LEVOTHROID) 75 MCG tablet 30 tablet 1     Sig: Take 1 tablet (75 mcg) by mouth daily       Thyroid Protocol Failed - 6/3/2021 12:08 PM        Failed - No active pregnancy on record     If patient is pregnant or has had a positive pregnancy test, please check TSH.          Failed - No positive pregnancy test in past 12 months     If patient is pregnant or has had a positive pregnancy test, please check TSH.          Passed - Patient is 12 years or older        Passed - Recent (12 mo) or future (30 days) visit within the authorizing provider's specialty     Patient has had an office visit with the authorizing provider or a provider within the authorizing providers department within the previous 12 mos or has a future within next 30 days. See \"Patient Info\" tab in inbasket, or \"Choose Columns\" in Meds & Orders section of the refill encounter.              Passed - Medication is active on med list        Passed - Normal TSH on file in past 12 months     Recent Labs   Lab Test 05/21/21  1600   TSH 2.15                 Pt last seen today for prenatal appointment.    Last prescribed 4/19/2021 for 30 tablets with 1 refill.    5/21/2021 TSH was normal.    Routing refill request to provider for review/approval because:  Patient is currently pregnant.    Usha Rueda RN on 6/3/2021 at 12:27 PM          "

## 2021-06-03 NOTE — PATIENT INSTRUCTIONS
Check insurance for coverage for genetic counseling and first trimester ultrasound with nuchal translucency.    
No

## 2021-06-04 ENCOUNTER — AMBULATORY - HEALTHEAST (OUTPATIENT)
Dept: MATERNAL FETAL MEDICINE | Facility: HOSPITAL | Age: 31
End: 2021-06-04

## 2021-06-10 ENCOUNTER — AMBULATORY - HEALTHEAST (OUTPATIENT)
Dept: LAB | Facility: HOSPITAL | Age: 31
End: 2021-06-10

## 2021-06-10 ENCOUNTER — RECORDS - HEALTHEAST (OUTPATIENT)
Dept: ULTRASOUND IMAGING | Facility: HOSPITAL | Age: 31
End: 2021-06-10

## 2021-06-10 ENCOUNTER — OFFICE VISIT - HEALTHEAST (OUTPATIENT)
Dept: MATERNAL FETAL MEDICINE | Facility: HOSPITAL | Age: 31
End: 2021-06-10

## 2021-06-10 ENCOUNTER — TRANSFERRED RECORDS (OUTPATIENT)
Dept: HEALTH INFORMATION MANAGEMENT | Facility: CLINIC | Age: 31
End: 2021-06-10

## 2021-06-10 DIAGNOSIS — O99.281 THYROID DISEASE DURING PREGNANCY IN FIRST TRIMESTER: ICD-10-CM

## 2021-06-10 DIAGNOSIS — Z36.9 FIRST TRIMESTER SCREENING: ICD-10-CM

## 2021-06-10 DIAGNOSIS — E07.9 THYROID DISEASE DURING PREGNANCY IN FIRST TRIMESTER: ICD-10-CM

## 2021-06-10 DIAGNOSIS — O26.90 PREGNANCY, ANTEPARTUM, COMPLICATIONS: ICD-10-CM

## 2021-06-10 DIAGNOSIS — O26.90 PREGNANCY RELATED CONDITIONS, UNSPECIFIED, UNSPECIFIED TRIMESTER: ICD-10-CM

## 2021-06-14 ENCOUNTER — MYC MEDICAL ADVICE (OUTPATIENT)
Dept: OBGYN | Facility: CLINIC | Age: 31
End: 2021-06-14

## 2021-06-14 ENCOUNTER — COMMUNICATION - HEALTHEAST (OUTPATIENT)
Dept: MATERNAL FETAL MEDICINE | Facility: HOSPITAL | Age: 31
End: 2021-06-14

## 2021-06-14 LAB — FIRST TRIMESTER SCREEN BIOCHEM MARKERS: NORMAL

## 2021-06-14 NOTE — TELEPHONE ENCOUNTER
, 14w3d.     Pt is wondering why she has never been told she has a retroverted uterus.  She is wondering if this is something she was born with or if pregnancy caused it.    I explained to her it is a normal anatomical variance.  She wants to know why no one told her that she had a retroverted uterus from previous pelvic exams or US.  She is wondering if maybe she didn't have a retroverted uterus prior to pregnancy and if that is why no one has told her this before.    I am unsure if pregnancy causes a uterus to be retroverted.  Routing to Dr. Mcnulty.    Chari Mckeon, RN

## 2021-06-22 ENCOUNTER — TELEPHONE (OUTPATIENT)
Dept: OBGYN | Facility: CLINIC | Age: 31
End: 2021-06-22

## 2021-06-22 DIAGNOSIS — R94.6 THYROID FUNCTION TEST ABNORMAL: Primary | ICD-10-CM

## 2021-06-22 NOTE — TELEPHONE ENCOUNTER
[facility] Call Center    Phone Message    May a detailed message be left on voicemail: yes     Reason for Call: Order(s): Other: TSH with free T4 reflex  Reason for requested: per patient per [facility]  Date needed: ASAP  Provider name: [facility]    Action Taken: Message routed to:  [facility] p [id number]    Travel Screening: Not Applicable

## 2021-06-25 DIAGNOSIS — R94.6 THYROID FUNCTION TEST ABNORMAL: ICD-10-CM

## 2021-06-25 LAB — TSH SERPL DL<=0.005 MIU/L-ACNC: 1.82 MU/L (ref 0.4–4)

## 2021-06-25 PROCEDURE — 36415 COLL VENOUS BLD VENIPUNCTURE: CPT | Performed by: OBSTETRICS & GYNECOLOGY

## 2021-06-25 PROCEDURE — 84443 ASSAY THYROID STIM HORMONE: CPT | Performed by: OBSTETRICS & GYNECOLOGY

## 2021-06-25 NOTE — TELEPHONE ENCOUNTER
Mckenna Morin disclosed her normal first trimester screening results. These results indicated a 1 in >10,000risk for Down syndrome and a 1 in >10,000 risk for trisomy 18/13.  This screening test gives information about the risk of some chromosome conditions in a pregnancy, but does not definitively diagnose or exclude the presence of these chromosome conditions. A copy was faxed to her primary OB.  Maternal serum AFP only is recommended in the second trimester to screen for neural tube defects.    Cecilia Pelletier MS, [facility]  Maternal Fetal Medicine  [phone]

## 2021-06-26 NOTE — PROGRESS NOTES
"Please see \"Imaging\" tab under \"Chart Review\" for details of today's visit.    Aleksandra Spring        "

## 2021-06-26 NOTE — PROGRESS NOTES
[facility] [place]  Genetic Counseling Consult    Patient: Patience Robertson YOB: 1990   Date of Service: 06/10/2021      Patience Robertson was seen at [facility] for genetic consultation to discuss the options for routine screening for fetal chromosome abnormalities.       Impression/Plan:   1.  Patience had an ultrasound and blood draw for first trimester screening.  Results are expected within 3-5 days, and will be available in [facility].  We will contact her to discuss the results, and a copy will be forwarded to the office of the referring OB provider. Patience requested a detailed message with results on her voicemail ([phone]).     2. Maternal serum AFP (single marker screen) is recommended after 15 weeks to screen for open neural tube defects. A quad screen should not be performed.    Pregnancy History:   /Parity:    Age at Delivery: 31 y.o.  KENZIE: 12/10/2021, by Last Menstrual Period  Gestational Age: 13w6d    No significant complications or exposures were reported in the current pregnancy.       Family History:   A three-generation pedigree was obtained, and is scanned under the  Media  tab.   The reported family history is negative for multiple miscarriages, stillbirths, birth defects, mental retardation, known genetic conditions, and consanguinity.       Carrier Screening:   The patient reports that she and the father of the pregnancy have  ancestry:      The hemoglobinopathies are a group of genetic blood diseases that occur with increased frequency in individuals of  ancestry and carrier screening for these conditions is available.  Carrier screening for the hemoglobinopathies includes a CBC with red blood cell indices, a ferritin level, and a quantitative hemoglobin electrophoresis or HPLC.  In addition,  screening in the [facility] includes many of the  hemoglobinopathies. Expanded carrier screening for mutations in a large panel of genes associated with autosomal recessive conditions including cystic fibrosis, spinal muscular atrophy, and others, is now available.    The patient has declined the carrier screening options reviewed today.       Risk Assessment for Chromosome Conditions:   We explained that the risk for fetal chromosome abnormalities increases with maternal age. We discussed specific features of common chromosome abnormalities, including Down syndrome, trisomy 13, trisomy 18, and sex chromosome trisomies.      At age 31 at delivery, the midtrimester risk to have a baby with Down syndrome is 1 in 690.     At age 31 at delivery, the midtrimester risk to have a baby with any chromosome abnormality is 1 in 345.        Testing Options:   We discussed the following options:  First trimester screening    First trimester ultrasound with nuchal translucency and nasal bone assessments, maternal plasma hCG, JOSE-A, and AFP measurement    Screens for fetal trisomy 21, trisomy 13, and trisomy 18    Cannot screen for open neural tube defects; maternal serum AFP after 15 weeks is recommended    Non-invasive Prenatal Testing (NIPT)    Maternal plasma cell-free DNA testing; first trimester ultrasound with nuchal translucency and nasal bone assessment is recommended, when appropriate    Screens for fetal trisomy 21, trisomy 13, trisomy 18, and sex chromosome aneuploidy    Cannot screen for open neural tube defects; maternal serum AFP after 15 weeks is recommended    Genetic Amniocentesis    Invasive procedure typically performed in the second trimester by which amniotic fluid is obtained for the purpose of chromosome analysis and/or other prenatal genetic analysis    Diagnostic results; >99% sensitivity for fetal chromosome abnormalities    AFAFP measurement tests for open neural tube defects    Comprehensive (Level II) ultrasound: Detailed ultrasound performed  between 18-22 weeks gestation to screen for major birth defects and markers for aneuploidy.    We reviewed the benefits and limitations of this testing.  Screening tests provide a risk assessment specific to the pregnancy for certain fetal chromosome abnormalities, but cannot definitively diagnose or exclude a fetal chromosome abnormality.  Follow-up genetic counseling and consideration of diagnostic testing is recommended with any abnormal screening result.      It was a pleasure to be involved with Patience s care. Face-to-face time of the meeting was 35 minutes.    Cecilia Pelletier MS, [facility]  Maternal Fetal Medicine  [facility]  Phone:[phone]  Phone: [phone]  Email: [email or web address]

## 2021-07-07 ENCOUNTER — MYC MEDICAL ADVICE (OUTPATIENT)
Dept: OBGYN | Facility: CLINIC | Age: 31
End: 2021-07-07

## 2021-07-07 NOTE — TELEPHONE ENCOUNTER
RN routing [facility] in clinic staff [facility] Otherwise, RN can do when in clinic tomorrow, 7/8/2021.    Usha Rueda RN on 7/7/2021 at 11:32 AM

## 2021-07-07 NOTE — TELEPHONE ENCOUNTER
[facility] referral and records faxed to [facility].    gNozi Murphy, CMA

## 2021-07-07 NOTE — TELEPHONE ENCOUNTER
Pt currently 17w5d, last seen 6/3/2021, next appt on 7/9/2021. RN notes pt has seen MFM for genetic screening.    RN routing to provider if pt is having 20 week anatomy scan with us or MFM, unable to find MFM referral in chart.    Pt also asking if pelvic exam can be done at her appt on 7/9 as she is concerned for any complications with her retroverted uterus. Pt states she has occasional back aches/cramping. Pt denies vaginal bleeding.    Usha Rueda RN on 7/7/2021 at 11:13 AM

## 2021-07-07 NOTE — TELEPHONE ENCOUNTER
"It looks like the initial [facility] referral was through the [facility] system. [facility] note reviewed and stated \"comprehenxive ultrasound will be scheduled at the [facility] MG office at 18-20 weeks\".  This has not been scheduled.     MG referral form completed, please fax.      Also, we can certainly do an exam at her next visit, but it may not be necessary.  We will discuss in more detail at her visit. If she is unable to empty her bladder, then she should go to the ED, otherwise the retroverted uterus is not likely causing problems.   "

## 2021-07-09 ENCOUNTER — PRENATAL OFFICE VISIT (OUTPATIENT)
Dept: OBGYN | Facility: CLINIC | Age: 31
End: 2021-07-09
Payer: COMMERCIAL

## 2021-07-09 VITALS
OXYGEN SATURATION: 100 % | WEIGHT: 145 LBS | SYSTOLIC BLOOD PRESSURE: 103 MMHG | BODY MASS INDEX: 26.95 KG/M2 | DIASTOLIC BLOOD PRESSURE: 62 MMHG | HEART RATE: 84 BPM

## 2021-07-09 DIAGNOSIS — Z34.02 ENCOUNTER FOR PRENATAL CARE IN SECOND TRIMESTER OF FIRST PREGNANCY: Primary | ICD-10-CM

## 2021-07-09 PROCEDURE — 99000 SPECIMEN HANDLING OFFICE-LAB: CPT | Performed by: OBSTETRICS & GYNECOLOGY

## 2021-07-09 PROCEDURE — 99207 PR PRENATAL VISIT: CPT | Performed by: OBSTETRICS & GYNECOLOGY

## 2021-07-09 PROCEDURE — 36415 COLL VENOUS BLD VENIPUNCTURE: CPT | Performed by: OBSTETRICS & GYNECOLOGY

## 2021-07-09 PROCEDURE — 82105 ALPHA-FETOPROTEIN SERUM: CPT | Mod: 90 | Performed by: OBSTETRICS & GYNECOLOGY

## 2021-07-09 NOTE — PROGRESS NOTES
Presents for routine  appointment.    Some back pain.    No abnormal discharge, no leaking fluid, no contractions, no vaginal bleeding   ROS:   and GI  negative.     Please see Prenatal Vitals and Notes Flowsheet for objective data.    A/P:  31 year old  at 18w0d       ICD-10-CM    1. Encounter for prenatal care in second trimester of first pregnancy  Z34.02 Alpha fetoprotein maternal screen       Genetic screening - normal 1st trimester screening results.  MSAFP today   20 week ultrasound with MFM - referral previously faxed.  This was schedule for the .    TSH with GCT  Link provided from [employer] regarding back pain in pregnancy.  Follow up in 4 week(s).      Roxana Mcnulty MD

## 2021-07-12 LAB
# FETUSES US: NORMAL
# FETUSES: NORMAL
AFP ADJ MOM AMN: 0.85
AFP SERPL-MCNC: 39 NG/ML
AGE - REPORTED: 30.7 YR
CURRENT SMOKER: NO
CURRENT SMOKER: NO
DIABETES STATUS PATIENT: NO
EGG DONOR AGE: NO
FAMILY MEMBER DISEASES HX: NO
FAMILY MEMBER DISEASES HX: NO
GA METHOD: NORMAL
GA METHOD: NORMAL
GA: NORMAL WK
IDDM PATIENT QL: NO
INTEGRATED SCN PATIENT-IMP: NORMAL
IVF PREGNANCY: NO
LMP START DATE: NORMAL
MONOCHORIONIC TWINS: NO
SERVICE CMNT-IMP: NO
SPECIMEN DRAWN SERPL: NORMAL
VALPROIC/CARBAMAZEPINE STATUS: NO
WEIGHT UNITS: NORMAL

## 2021-07-19 ENCOUNTER — TRANSFERRED RECORDS (OUTPATIENT)
Dept: HEALTH INFORMATION MANAGEMENT | Facility: CLINIC | Age: 31
End: 2021-07-19

## 2021-08-06 ENCOUNTER — PRENATAL OFFICE VISIT (OUTPATIENT)
Dept: OBGYN | Facility: CLINIC | Age: 31
End: 2021-08-06
Payer: COMMERCIAL

## 2021-08-06 VITALS
DIASTOLIC BLOOD PRESSURE: 71 MMHG | HEART RATE: 88 BPM | WEIGHT: 150.9 LBS | OXYGEN SATURATION: 100 % | SYSTOLIC BLOOD PRESSURE: 114 MMHG | BODY MASS INDEX: 28.05 KG/M2

## 2021-08-06 DIAGNOSIS — O99.280 HYPOTHYROID IN PREGNANCY, ANTEPARTUM: ICD-10-CM

## 2021-08-06 DIAGNOSIS — E03.9 HYPOTHYROID IN PREGNANCY, ANTEPARTUM: ICD-10-CM

## 2021-08-06 DIAGNOSIS — Z34.02 ENCOUNTER FOR PRENATAL CARE IN SECOND TRIMESTER OF FIRST PREGNANCY: Primary | ICD-10-CM

## 2021-08-06 PROBLEM — Z31.41 FERTILITY TESTING: Status: RESOLVED | Noted: 2020-10-13 | Resolved: 2021-08-06

## 2021-08-06 PROCEDURE — 99207 PR PRENATAL VISIT: CPT | Performed by: OBSTETRICS & GYNECOLOGY

## 2021-08-06 NOTE — PROGRESS NOTES
Presents for routine  appointment.     No complaints.    No abnormal discharge , no leaking fluid , no contractions , no vaginal bleeding    ROS:   and GI  negative.     Please see Prenatal Vitals and Notes Flowsheet for objective data.  Lab Results   Component Value Date    ABO A 2021    RH Pos 2021       A/P:  31 year old  at 22w0d       ICD-10-CM    1. Encounter for prenatal care in second trimester of first pregnancy  Z34.02    2. Hypothyroid in pregnancy, antepartum  O99.280     E03.9        Discussed ultrasound results - MFM note reviewed, plan repeat in 28 weeks for growth.  This was scheduled.    GCT, hgb greater or equal to 24 weeks   Follow up in 4 weeks.      Roxana Mcnulty MD    [facility] COMPREHENSIVE SINGLE    Impression    Indication   ========     Hypothyroidism     Method   ======     Transabdominal ultrasound examination. View: Sufficient.     Pregnancy   =========     Roman pregnancy. Number of fetuses: 1     Dating   ======                                              Date                                Details                                                                                      Gest. age                      KENZIE   LMP                                  3/5/2021                                                                                                                           19 w + 3 d                     12/10/2021   Previous U/S                      2021                          GA, GA 8 w + 5 d                                                                       19 w + 1 d                     2021   U/S                                   2021                         based upon AC, BPD, Femur, HC                                                18 w + 5 d                     12/15/2021   Assigned dating                  based on the LMP, selected on 2021                                                                          19 w + 3 d                     12/10/2021     General Evaluation   ==============     Cardiac activity present.  bpm. Fetal movements: present. Presentation: breech   Placenta: posterior, No Previa, > 2 cm from internal os.   Umbilical cord:   3 vessel cord, normal insertion   Amniotic fluid: Amount of AF: normal. MVP 5.4 cm     Fetal Biometry   ============     BPD                                                     42.0                       mm                              18w 5d                Hadlock   OFD                                                     56.2                       mm                              18w 4d                Nicolaides   HC                                                       157.1                      mm                             18w 4d                Hadlock   Cerebellum tr                                         20.2                       mm                             19w 1d                Nicolaides   Nuchal fold                                            3.0                         mm   AC                                                       133.7                      mm                             18w 6d        26%         Hadlock   Femur                                                   28.9                       mm                             18w 6d                Hadlock   Humerus                                               29.2                       mm                             19w 4d                 Rogerio   Fetal Weight Calculation:   EFW                                        260                     g                                     17%        Hadlock   EFW (lb,oz)                              0 lb 9                  oz   EFW by        Hadlock (BPD-HC-AC-FL)   Head / Face / Neck Biometry:                                          6.1                                              mm   CM                                      3.1                                               mm   Nasal         5.5       mm   bone     Fetal Anatomy   ===========     The following structures appear normal:   Head / Neck                         Cranium. Head size. Head shape. Lateral ventricles. Choroid plexus. Midline falx. Cavum septi pellucidi. Cerebellum. Cisterna magna.                                              Parenchyma. Thalami. Vermis.                                              Neck. Nuchal fold.   Face                                   Lips. Profile. Nose. Maxilla. Mandible. Orbits. Lens.   Heart / Thorax                      4-chamber view. RVOT view. LVOT view. 3-vessel view. 3-vessel-trachea view. Situs. Aortic arch view. Bicaval view. Ductal arch view. Superior                                              vena cava. Inferior vena cava. Cardiac axis. Cardiac size. Cardiac rhythm.                                              Right lung. Left lung. Diaphragm.   Abdomen                             Abdominal wall. Cord insertion. Stomach. Kidneys. Bladder. Liver. Bowel. Genitals.   Spine                                  Cervical spine. Thoracic spine. Lumbar spine. Sacral spine.   Extremities / Skeleton          Arms. Right hand. Left hand. Legs. Right foot. Left foot.     Maternal Structures   ===============     Cervix                                  Visualized                                              Appearance: Appears Closed                                              Cervical length 46.4 mm   Right Ovary                          Visualized   Left Ovary                            Visualized     Impression   =========     1) Roman intrauterine pregnancy at 19w 3d gestational age.   2) None of the anomalies commonly detected by ultrasound were evident in the detailed fetal anatomic survey as described above.   3) Growth parameters and estimated fetal weight were consistent with established dates.   4) The amniotic fluid volume appeared normal.   5) Normal  fetal activity for gestational age.   6) On transabdominal imaging the cervix appears long and closed.     Recommendation   ==============     Thank-you for referring your patient for a comprehensive ultrasound. She had first trimester screening predicting a trisomy 21 risk of 1:10,000 and trisomy 18 risk of   1:10,000. MSAFP was 0.85 MoM.     I discussed the findings on today's ultrasound with the patient. I reviewed the limitations of ultrasound both in detecting aneuploidy and structural abnormalities. Ultrasound   can routinely detect 80-90% of structural abnormalities. Patience declined further aneuploidy assessment. COVID-19 precautions were reviewed.     Recommend follow-up assessment of fetal growth at 28 weeks due to EFW at the 17th percentile in the setting of hypothyroidism.     Return to primary provider for continued prenatal care.     If you have questions regarding today's evaluation or if we can be of further service, please contact the [facility].     **Fetal anomalies may be present but not detected**     REPORT SIGNED BY: GWEN UPTON MD

## 2021-08-27 ENCOUNTER — PRENATAL OFFICE VISIT (OUTPATIENT)
Dept: OBGYN | Facility: CLINIC | Age: 31
End: 2021-08-27
Payer: COMMERCIAL

## 2021-08-27 VITALS
BODY MASS INDEX: 28.79 KG/M2 | SYSTOLIC BLOOD PRESSURE: 106 MMHG | HEART RATE: 80 BPM | DIASTOLIC BLOOD PRESSURE: 62 MMHG | WEIGHT: 154.9 LBS | OXYGEN SATURATION: 99 %

## 2021-08-27 DIAGNOSIS — E03.9 HYPOTHYROID IN PREGNANCY, ANTEPARTUM: ICD-10-CM

## 2021-08-27 DIAGNOSIS — O99.280 HYPOTHYROID IN PREGNANCY, ANTEPARTUM: ICD-10-CM

## 2021-08-27 DIAGNOSIS — Z34.02 ENCOUNTER FOR PRENATAL CARE IN SECOND TRIMESTER OF FIRST PREGNANCY: Primary | ICD-10-CM

## 2021-08-27 LAB
GLUCOSE 1H P 50 G GLC PO SERPL-MCNC: 115 MG/DL (ref 70–129)
HGB BLD-MCNC: 10 G/DL (ref 11.7–15.7)
TSH SERPL DL<=0.005 MIU/L-ACNC: 1.16 MU/L (ref 0.4–4)

## 2021-08-27 PROCEDURE — 82952 GTT-ADDED SAMPLES: CPT | Performed by: OBSTETRICS & GYNECOLOGY

## 2021-08-27 PROCEDURE — 36415 COLL VENOUS BLD VENIPUNCTURE: CPT | Performed by: OBSTETRICS & GYNECOLOGY

## 2021-08-27 PROCEDURE — 99207 PR PRENATAL VISIT: CPT | Performed by: OBSTETRICS & GYNECOLOGY

## 2021-08-27 PROCEDURE — 84443 ASSAY THYROID STIM HORMONE: CPT | Performed by: OBSTETRICS & GYNECOLOGY

## 2021-08-27 NOTE — PROGRESS NOTES
Presents for routine  appointment.     No complaints.    No abnormal discharge , no leaking fluid , no contractions , no vaginal bleeding    ROS:   and GI  negative.     Please see Prenatal Vitals and Notes Flowsheet for objective data.  Lab Results   Component Value Date    ABO A 2021    RH Pos 2021       A/P:  31 year old  at 25w0d       ICD-10-CM    1. Encounter for prenatal care in second trimester of first pregnancy  Z34.02    2. Hypothyroid in pregnancy, antepartum  O99.280     E03.9        1 hr glucola today.  She does have access to MyChart.  She is Rh Positive  TDAP  next visit.    Discussed signs and symptoms of  labor  She has appointment scheduled with [facility] on  for follow-up growth.  Follow up in 4  weeks.  She is scheduled to see me on .      Roxana Mcnulty MD

## 2021-09-17 ENCOUNTER — PRENATAL OFFICE VISIT (OUTPATIENT)
Dept: OBGYN | Facility: CLINIC | Age: 31
End: 2021-09-17
Payer: COMMERCIAL

## 2021-09-17 VITALS
DIASTOLIC BLOOD PRESSURE: 66 MMHG | BODY MASS INDEX: 30.06 KG/M2 | SYSTOLIC BLOOD PRESSURE: 120 MMHG | OXYGEN SATURATION: 100 % | WEIGHT: 161.7 LBS | HEART RATE: 86 BPM

## 2021-09-17 DIAGNOSIS — Z23 NEED FOR PROPHYLACTIC VACCINATION AND INOCULATION AGAINST INFLUENZA: ICD-10-CM

## 2021-09-17 DIAGNOSIS — Z34.03 ENCOUNTER FOR PRENATAL CARE IN THIRD TRIMESTER OF FIRST PREGNANCY: Primary | ICD-10-CM

## 2021-09-17 DIAGNOSIS — O99.019 ANEMIA DURING PREGNANCY: ICD-10-CM

## 2021-09-17 DIAGNOSIS — Z23 NEED FOR TDAP VACCINATION: ICD-10-CM

## 2021-09-17 DIAGNOSIS — O99.280 HYPOTHYROID IN PREGNANCY, ANTEPARTUM: ICD-10-CM

## 2021-09-17 DIAGNOSIS — E03.9 HYPOTHYROID IN PREGNANCY, ANTEPARTUM: ICD-10-CM

## 2021-09-17 LAB
FERRITIN SERPL-MCNC: 33 NG/ML (ref 12–150)
HGB BLD-MCNC: 10.3 G/DL (ref 11.7–15.7)

## 2021-09-17 PROCEDURE — 99207 PR PRENATAL VISIT: CPT | Performed by: OBSTETRICS & GYNECOLOGY

## 2021-09-17 PROCEDURE — 85018 HEMOGLOBIN: CPT | Performed by: OBSTETRICS & GYNECOLOGY

## 2021-09-17 PROCEDURE — 36415 COLL VENOUS BLD VENIPUNCTURE: CPT | Performed by: OBSTETRICS & GYNECOLOGY

## 2021-09-17 PROCEDURE — 90472 IMMUNIZATION ADMIN EACH ADD: CPT | Performed by: OBSTETRICS & GYNECOLOGY

## 2021-09-17 PROCEDURE — 90471 IMMUNIZATION ADMIN: CPT | Performed by: OBSTETRICS & GYNECOLOGY

## 2021-09-17 PROCEDURE — 82728 ASSAY OF FERRITIN: CPT | Performed by: OBSTETRICS & GYNECOLOGY

## 2021-09-17 PROCEDURE — 90686 IIV4 VACC NO PRSV 0.5 ML IM: CPT | Performed by: OBSTETRICS & GYNECOLOGY

## 2021-09-17 PROCEDURE — 90715 TDAP VACCINE 7 YRS/> IM: CPT | Performed by: OBSTETRICS & GYNECOLOGY

## 2021-09-17 NOTE — PROGRESS NOTES
Presents for routine  appointment.     No complaints aside from some swelling.  She has been starting to go in to work.  No abnormal discharge , no leaking fluid , no contractions , no vaginal bleeding    ROS:   and GI  negative.     Please see Prenatal Vitals and Notes Flowsheet for objective data.  Hemoglobin   Date Value Ref Range Status   2021 10.3 (L) 11.7 - 15.7 g/dL Final   2021 11.7 11.7 - 15.7 g/dL Final     Lab Results   Component Value Date    ABO A 2021    RH Pos 2021       A/P:  31 year old  at 28w0d       ICD-10-CM    1. Encounter for prenatal care in third trimester of first pregnancy  Z34.03    2. Hypothyroid in pregnancy, antepartum  O99.280     E03.9    3. Anemia during pregnancy  O99.019 Hemoglobin     Ferritin     Ferritin     Hemoglobin   4. Need for Tdap vaccination  Z23 TDAP VACCINE (Adacel, Boostrix)  [[id number]]   5. Need for prophylactic vaccination and inoculation against influenza  Z23 INFLUENZA VACCINE IM > 6 MONTHS VALENT IIV4 (AFLURIA/FLUZONE)       GCT Normal  TDAP today.    Rhogam: not needed  Discussed kick counts   May consider compression hose if needed   Follow up in 2 weeks.      Roxana Mcnulty MD

## 2021-09-20 ENCOUNTER — TRANSFERRED RECORDS (OUTPATIENT)
Dept: HEALTH INFORMATION MANAGEMENT | Facility: CLINIC | Age: 31
End: 2021-09-20

## 2021-10-01 ENCOUNTER — PRENATAL OFFICE VISIT (OUTPATIENT)
Dept: OBGYN | Facility: CLINIC | Age: 31
End: 2021-10-01
Payer: COMMERCIAL

## 2021-10-01 VITALS
SYSTOLIC BLOOD PRESSURE: 100 MMHG | WEIGHT: 163 LBS | BODY MASS INDEX: 30.3 KG/M2 | OXYGEN SATURATION: 99 % | DIASTOLIC BLOOD PRESSURE: 63 MMHG | HEART RATE: 64 BPM

## 2021-10-01 DIAGNOSIS — E03.9 HYPOTHYROID IN PREGNANCY, ANTEPARTUM: ICD-10-CM

## 2021-10-01 DIAGNOSIS — O99.280 HYPOTHYROID IN PREGNANCY, ANTEPARTUM: ICD-10-CM

## 2021-10-01 DIAGNOSIS — O99.019 ANEMIA DURING PREGNANCY: ICD-10-CM

## 2021-10-01 DIAGNOSIS — Z34.03 ENCOUNTER FOR PRENATAL CARE IN THIRD TRIMESTER OF FIRST PREGNANCY: Primary | ICD-10-CM

## 2021-10-01 PROCEDURE — 99207 PR PRENATAL VISIT: CPT | Performed by: OBSTETRICS & GYNECOLOGY

## 2021-10-01 NOTE — PROGRESS NOTES
Presents for routine  appointment.     No complaints aside from back pain and carpal tunnel.    No abnormal discharge, no leaking fluid, no contractions, no vaginal bleeding   ROS:   and GI  negative.     Please see Prenatal Vitals and Notes Flowsheet for objective data.    A/P:  31 year old  at 30w0d       ICD-10-CM    1. Encounter for prenatal care in third trimester of first pregnancy  Z34.03        Tdap and influenza vaccines given at previous visit  Recheck hemoglobin and ferritin at 32 weeks    [facility] US from  reviewed, see below. No further US at this time.   Follow up in 2 weeks.      Roxana Mcnulty MD     Impression   =========     1. Roman intrauterine pregnancy at 28w3d gestational age here for assessment of fetal growth.   2. None of the anomalies commonly detected by ultrasound were evident in the limited fetal anatomic survey as described above, anatomy limited by gestational age and   fetal lie.   3. Growth parameters and estimated fetal weight were consistent with established dates.   4. The amniotic fluid volume appeared normal.     Recommendation   ==============     Thank-you for referring your patient to assess fetal growth.     I discussed the findings on today's ultrasound with the patient. Further ultrasounds as clinically indicated.     Return to primary provider for continued prenatal care.     If you have questions regarding today's evaluation or if we can be of further service, please contact the [facility].     **Fetal anomalies may be present but not detected**       Patient is a 79y old  Female who presents with a chief complaint of Right Leg Pain (11 Oct 2019 09:44)      INTERVAL HPI/OVERNIGHT EVENTS: noted, feels well      Vital Signs Last 24 Hrs  T(C): 37.2 (11 Oct 2019 10:38), Max: 37.5 (10 Oct 2019 14:11)  T(F): 99 (11 Oct 2019 10:38), Max: 99.5 (10 Oct 2019 14:11)  HR: 71 (11 Oct 2019 10:38) (71 - 88)  BP: 138/71 (11 Oct 2019 10:38) (120/65 - 170/83)  BP(mean): --  RR: 18 (11 Oct 2019 10:38) (16 - 18)  SpO2: 95% (11 Oct 2019 10:38) (95% - 96%)    acetaminophen   Tablet .. 975 milliGRAM(s) Oral every 6 hours PRN  amLODIPine   Tablet 10 milliGRAM(s) Oral daily  aspirin  chewable 81 milliGRAM(s) Oral daily  ATENolol  Tablet 50 milliGRAM(s) Oral daily  baclofen 20 milliGRAM(s) Oral <User Schedule>  benzocaine 15 mG/menthol 3.6 mG (Sugar-Free) Lozenge 1 Lozenge Oral every 3 hours PRN  docusate sodium 100 milliGRAM(s) Oral three times a day  enoxaparin Injectable 40 milliGRAM(s) SubCutaneous daily  ondansetron Injectable 4 milliGRAM(s) IV Push every 6 hours PRN  oxybutynin 5 milliGRAM(s) Oral daily  pantoprazole    Tablet 40 milliGRAM(s) Oral before breakfast  potassium acid phosphate/sodium acid phosphate tablet (K-PHOS No. 2) 1 Tablet(s) Oral four times a day with meals  senna 2 Tablet(s) Oral at bedtime  tetracaine/benzocaine/butamben Spray 1 Spray(s) Topical every 3 hours PRN  traMADol 25 milliGRAM(s) Oral every 6 hours PRN      PHYSICAL EXAM:  GENERAL: NAD,   EYES: conjunctiva and sclera clear  ENMT: Moist mucous membranes  NECK: Supple, No JVD, Normal thyroid  NERVOUS SYSTEM:  Alert & Oriented X,   CHEST/LUNG: Clear to auscultation bilaterally; No rales, rhonchi, wheezing, or rubs  HEART: Regular rate and rhythm; No murmurs, rubs, or gallops  ABDOMEN: Soft, Nontender, Nondistended; Bowel sounds present  EXTREMITIES:  2+ Peripheral Pulses, No clubbing, cyanosis, or edema  LYMPH: No lymphadenopathy noted  SKIN: No rashes or lesions    Consultant(s) Notes Reviewed:  [x ] YES  [ ] NO  Care Discussed with Consultants/Other Providers [ x] YES  [ ] NO    LABS:                        8.7    7.04  )-----------( 232      ( 11 Oct 2019 06:49 )             28.4     10-11    140  |  101  |  17  ----------------------------<  97  3.4<L>   |  29  |  0.56    Ca    9.3      11 Oct 2019 06:49  Phos  2.6     10-11  Mg     2.0     10-11          CAPILLARY BLOOD GLUCOSE                  RADIOLOGY & ADDITIONAL TESTS:    Imaging Personally Reviewed:  [x ] YES  [ ] NO

## 2021-10-19 ENCOUNTER — TELEPHONE (OUTPATIENT)
Dept: OBGYN | Facility: CLINIC | Age: 31
End: 2021-10-19

## 2021-10-19 NOTE — TELEPHONE ENCOUNTER
32w4d. Last seen on 10/1/21 for prenatal care. Next prenatal appt scheduled 10/22/21.    Contacts clinic w/concerns for swelling more on right foot than left for last 2-3 days.   CMS on legs per pt report:   Right foot appears more pale than left by the end of day.  Slight numbness on right top foot towards ankle by end of day.  After lying down, swelling resolves, color and numbness improves.  Denies redness/pain/swelling on right leg to indicate blood clot.    Compression hose: Started wearing compression socks today, but not using them regularly.      Active fetus: yes  Vaginal bleeding: no  Giovanny: no  Vaginal discharge since yesterday pt noticed has become yellowish green, thicker, mucousy, denies odor, vaginal itching, or burning.    RN recommends using compression hose on both legs routinely during the day, elevating legs throughout the day and after work and in the evening, as well as staying well hydrated w/80-100oz water daily (pt drinks 3L/day).    Denies, Headaches, Visual changes, RUQ/ severe epigastric pain, N/V.  Has occasional heart burn if eats before bed. Encouraged to not eat within an hour before bed, and/or elevate upper body w/sleep as needed.     Routing to provider for advice if pt needs to be seen in clinic prior to next prenatal appt scheduled for 10/22/21 to evaluate vaginal discharge and for other advice not given by RN regarding leg swelling in pregnancy.    GUNJAN SteinN RN

## 2021-10-19 NOTE — PATIENT INSTRUCTIONS
If you have labs or imaging done, the results will automatically release in [employer] without an interpretation.  Your health care professional will review those results and send an interpretation with recommendations as soon as possible, but this may be 1-3 business days.    If you have any questions regarding your visit, please contact your care team.     [employer]: [phone]  [facility] CLINIC HOURS TELEPHONE NUMBER       MD Lora Hoffmann - OSMEL Kerr-MAHESH Marcelino-MAHESH Cameron-MAHESH Zurita-  Ofelia-     Monday- [place]  8:00 a.m - 5:00 p.m    Tuesday- Surgery    Wednesday- Admin    Thursday- [place]  8:00 a.m - 5:00 p.m.    Friday- [place]  7:30 a.m - 4:00 p.m. [facility]  [address]  [place] [address]  [phone] Fax  Imaging [phone]    [facility] Labor and Delivery  [address] [place] [address] [place] [address]  [phone]    [facility]  [address] [facility] [address] [facility] [place] [address]  [phone] Fax  Imaging [phone]     Urgent Care locations:    [facility] Monday-Friday  10 am - 8 pm  Saturday and Sunday   9 am - 5 pm  Monday-Friday   10 am - 8 pm  Saturday and Sunday   9 am - 5 pm   [phone]     If you need a medication refill, please contact your pharmacy. Please allow 3 business days for your refill to be completed.    As always, thank you for trusting us with your healthcare needs!

## 2021-10-19 NOTE — TELEPHONE ENCOUNTER
[facility] Call Center    Phone Message    May a detailed message be left on voicemail: yes     Reason for Call: Pt said that one of her feet is more swollen than the other and she'd like to talk to a nurse about it.  Thanks.    Action Taken: Message routed to:  [facility] p [phone]    Travel Screening: Not Applicable

## 2021-10-20 ENCOUNTER — MYC MEDICAL ADVICE (OUTPATIENT)
Dept: OBGYN | Facility: CLINIC | Age: 31
End: 2021-10-20

## 2021-10-20 NOTE — TELEPHONE ENCOUNTER
This is difficult to assess over the phone and long message, so she may need to be seen sooner than Friday.  It looks like Lalitha may have openings today.  Please see if she would be willing to see this patient.

## 2021-10-20 NOTE — TELEPHONE ENCOUNTER
RN spoke with Lalitha Haider in clinic today, is willing to see pt today, preferably at 12:50pm.    RN called pt to offer pt 12:50pm appt. Pt is at work, needs to speak to work to see if she can make it to this appointment today. Pt will MyChart to let clinic know. RN will hold 1pm appt.    GUNJAN SteinN RN

## 2021-10-21 ENCOUNTER — PRENATAL OFFICE VISIT (OUTPATIENT)
Dept: OBGYN | Facility: CLINIC | Age: 31
End: 2021-10-21
Payer: COMMERCIAL

## 2021-10-21 VITALS
BODY MASS INDEX: 31.3 KG/M2 | WEIGHT: 168.4 LBS | SYSTOLIC BLOOD PRESSURE: 108 MMHG | DIASTOLIC BLOOD PRESSURE: 66 MMHG | HEART RATE: 74 BPM | OXYGEN SATURATION: 97 %

## 2021-10-21 DIAGNOSIS — N89.8 VAGINAL DISCHARGE: ICD-10-CM

## 2021-10-21 DIAGNOSIS — B37.31 YEAST INFECTION OF THE VAGINA: Primary | ICD-10-CM

## 2021-10-21 DIAGNOSIS — Z34.02 SUPERVISION OF NORMAL FIRST PREGNANCY IN SECOND TRIMESTER: Primary | ICD-10-CM

## 2021-10-21 LAB
CLUE CELLS: ABNORMAL
TRICHOMONAS, WET PREP: ABNORMAL
WBC'S/HIGH POWER FIELD, WET PREP: ABNORMAL
YEAST, WET PREP: PRESENT

## 2021-10-21 PROCEDURE — 87210 SMEAR WET MOUNT SALINE/INK: CPT | Performed by: OBSTETRICS & GYNECOLOGY

## 2021-10-21 PROCEDURE — 99207 PR PRENATAL VISIT: CPT | Performed by: OBSTETRICS & GYNECOLOGY

## 2021-10-21 RX ORDER — TERCONAZOLE 0.4 %
1 CREAM WITH APPLICATOR VAGINAL AT BEDTIME
Qty: 45 G | Refills: 0 | Status: SHIPPED | OUTPATIENT
Start: 2021-10-21 | End: 2021-10-28

## 2021-10-21 ASSESSMENT — PAIN SCALES - GENERAL: PAINLEVEL: NO PAIN (0)

## 2021-10-21 NOTE — PROGRESS NOTES
She reports feeling reassuring daily fetal activity and will continue to record.  She gained 5 lbs since her last visit and denies any fluid leakage or regular uterine contractions.  She does c/o the new onset of vaginal discharge so a wet prep was obtained and submitted.  She denies any risk of STD exposure so declined testing.

## 2021-10-22 ENCOUNTER — PRENATAL OFFICE VISIT (OUTPATIENT)
Dept: OBGYN | Facility: CLINIC | Age: 31
End: 2021-10-22
Payer: COMMERCIAL

## 2021-10-22 VITALS
WEIGHT: 169.7 LBS | DIASTOLIC BLOOD PRESSURE: 65 MMHG | HEART RATE: 86 BPM | OXYGEN SATURATION: 100 % | BODY MASS INDEX: 31.55 KG/M2 | SYSTOLIC BLOOD PRESSURE: 112 MMHG

## 2021-10-22 DIAGNOSIS — O99.019 ANEMIA DURING PREGNANCY: Primary | ICD-10-CM

## 2021-10-22 DIAGNOSIS — R94.6 THYROID FUNCTION TEST ABNORMAL: ICD-10-CM

## 2021-10-22 LAB
FERRITIN SERPL-MCNC: 31 NG/ML (ref 12–150)
HGB BLD-MCNC: 10.2 G/DL (ref 11.7–15.7)
TSH SERPL DL<=0.005 MIU/L-ACNC: 1.1 MU/L (ref 0.4–4)

## 2021-10-22 PROCEDURE — 84443 ASSAY THYROID STIM HORMONE: CPT | Performed by: OBSTETRICS & GYNECOLOGY

## 2021-10-22 PROCEDURE — 36415 COLL VENOUS BLD VENIPUNCTURE: CPT | Performed by: OBSTETRICS & GYNECOLOGY

## 2021-10-22 PROCEDURE — 82728 ASSAY OF FERRITIN: CPT | Performed by: OBSTETRICS & GYNECOLOGY

## 2021-10-22 PROCEDURE — 99207 PR PRENATAL VISIT: CPT | Performed by: OBSTETRICS & GYNECOLOGY

## 2021-10-22 NOTE — PROGRESS NOTES
Presents for routine  appointment.  She was seen yesterday for abnormal discharge.  She was found to have a yeast infection and a prescription was sent in but she has not started that yet.         no leaking fluid , no contractions , no vaginal bleeding    ROS:   and GI  negative.     Please see Prenatal Vitals and Notes Flowsheet for objective data.    A/P:  31 year old  at 33w0d       ICD-10-CM    1. Anemia during pregnancy  O99.019 OB hemoglobin     Ferritin     Ferritin     OB hemoglobin   2. Thyroid function test abnormal  R94.6 TSH with free T4 reflex     TSH with free T4 reflex       Tdap given at previous visit  She was encouraged to start the medication prescribed by Dr. Bee.   Questions answered  Follow up in 2 weeks.      Roxana Mcnulty MD

## 2021-11-08 ENCOUNTER — PRENATAL OFFICE VISIT (OUTPATIENT)
Dept: OBGYN | Facility: CLINIC | Age: 31
End: 2021-11-08
Payer: COMMERCIAL

## 2021-11-08 VITALS
WEIGHT: 172.4 LBS | BODY MASS INDEX: 32.05 KG/M2 | OXYGEN SATURATION: 100 % | DIASTOLIC BLOOD PRESSURE: 69 MMHG | SYSTOLIC BLOOD PRESSURE: 115 MMHG | HEART RATE: 112 BPM

## 2021-11-08 DIAGNOSIS — Z34.03 SUPERVISION OF NORMAL FIRST PREGNANCY IN THIRD TRIMESTER: Primary | ICD-10-CM

## 2021-11-08 PROCEDURE — 99207 PR PRENATAL VISIT: CPT | Performed by: OBSTETRICS & GYNECOLOGY

## 2021-11-08 NOTE — PROGRESS NOTES
Presents for routine  appointment.     No complaints.  Discharge is better since she completed the medication.  BH yesterday  No abnormal discharge, no leaking fluid, no contractions, no vaginal bleeding   ROS:   and GI  negative.     Please see Prenatal Vitals and Notes Flowsheet for objective data.    A/P:  31 year old  at 35w3d       ICD-10-CM    1. Supervision of normal first pregnancy in third trimester  Z34.03        Reportable signs and symptoms discussed  Group B Strep next visit  Follow up in 1 week      Roxana Mcnulty MD

## 2021-11-08 NOTE — PATIENT INSTRUCTIONS
If you have labs or imaging done, the results will automatically release in [employer] without an interpretation.  Your health care professional will review those results and send an interpretation with recommendations as soon as possible, but this may be 1-3 business days.    If you have any questions regarding your visit, please contact your care team.     [employer] Access [employer]: [phone]  [facility] CLINIC HOURS TELEPHONE NUMBER       MD Lora Hoffmann - OSMEL Kerr-MAHESH Marcelino-MAHESH Cameron-MAHESH Zurita-  [place]-     Monday- [place]  8:00 a.m - 5:00 p.m    Tuesday- Surgery    Wednesday- Admin    Thursday- [place]  8:00 a.m - 5:00 p.m.    Friday- [place]  7:30 a.m - 4:00 p.m. [facility]  [address]  [place] [address]  [phone] Fax  Imaging [phone]    [facility] Labor and Delivery  [address] [place] [address] [place] [address]  [phone]    [facility]  [address] [facility] [place], [place] [address] [phone] Fax  Imaging [phone]     Urgent Care locations:    [facility] Monday-Friday  10 am - 8 pm  Saturday and Sunday   9 am - 5 pm  Monday-Friday   10 am - 8 pm  Saturday and Sunday   9 am - 5 pm   [phone]     If you need a medication refill, please contact your pharmacy. Please allow 3 business days for your refill to be completed.    As always, thank you for trusting us with your healthcare needs!

## 2021-11-15 ENCOUNTER — PRENATAL OFFICE VISIT (OUTPATIENT)
Dept: OBGYN | Facility: CLINIC | Age: 31
End: 2021-11-15
Payer: COMMERCIAL

## 2021-11-15 VITALS
SYSTOLIC BLOOD PRESSURE: 106 MMHG | OXYGEN SATURATION: 100 % | BODY MASS INDEX: 32.07 KG/M2 | HEART RATE: 102 BPM | WEIGHT: 172.5 LBS | DIASTOLIC BLOOD PRESSURE: 64 MMHG

## 2021-11-15 DIAGNOSIS — O99.019 ANEMIA DURING PREGNANCY: ICD-10-CM

## 2021-11-15 DIAGNOSIS — Z34.03 SUPERVISION OF NORMAL FIRST PREGNANCY IN THIRD TRIMESTER: Primary | ICD-10-CM

## 2021-11-15 PROBLEM — Z23 NEED FOR TDAP VACCINATION: Status: RESOLVED | Noted: 2021-04-15 | Resolved: 2021-11-15

## 2021-11-15 LAB
ERYTHROCYTE [DISTWIDTH] IN BLOOD BY AUTOMATED COUNT: 13 % (ref 10–15)
FERRITIN SERPL-MCNC: 34 NG/ML (ref 12–150)
HCT VFR BLD AUTO: 32 % (ref 35–47)
HGB BLD-MCNC: 10.8 G/DL (ref 11.7–15.7)
MCH RBC QN AUTO: 32.7 PG (ref 26.5–33)
MCHC RBC AUTO-ENTMCNC: 33.8 G/DL (ref 31.5–36.5)
MCV RBC AUTO: 97 FL (ref 78–100)
PLATELET # BLD AUTO: 235 10E3/UL (ref 150–450)
RBC # BLD AUTO: 3.3 10E6/UL (ref 3.8–5.2)
WBC # BLD AUTO: 11.4 10E3/UL (ref 4–11)

## 2021-11-15 PROCEDURE — 99207 PR PRENATAL VISIT: CPT | Performed by: OBSTETRICS & GYNECOLOGY

## 2021-11-15 PROCEDURE — 85027 COMPLETE CBC AUTOMATED: CPT | Performed by: OBSTETRICS & GYNECOLOGY

## 2021-11-15 PROCEDURE — 36415 COLL VENOUS BLD VENIPUNCTURE: CPT | Performed by: OBSTETRICS & GYNECOLOGY

## 2021-11-15 PROCEDURE — 82728 ASSAY OF FERRITIN: CPT | Performed by: OBSTETRICS & GYNECOLOGY

## 2021-11-15 PROCEDURE — 87653 STREP B DNA AMP PROBE: CPT | Performed by: OBSTETRICS & GYNECOLOGY

## 2021-11-15 NOTE — PROGRESS NOTES
Presents for routine  appointment.     No complaints.    No abnormal discharge , no leaking fluid , no contractions , no vaginal bleeding    ROS:   and GI  negative.     Please see Prenatal Vitals and Notes Flowsheet for objective data.  Hemoglobin   Date Value Ref Range Status   10/22/2021 10.2 (L) 11.7 - 15.7 g/dL Final   2021 11.7 11.7 - 15.7 g/dL Final   ]     A/P:  31 year old  at 36w3d       ICD-10-CM    1. Supervision of normal first pregnancy in third trimester  Z34.03 Group B strep PCR   2. Anemia during pregnancy  O99.019 Ferritin     CBC with platelets       Group B Strep collected today  Discussed labor and what to expect. Discussed when to go to the birth center.  Follow up in 1 week.      Roxana Mcnulty MD

## 2021-11-16 LAB
GP B STREP DNA SPEC QL NAA+PROBE: POSITIVE
PATIENT PENICILLIN, AMOXICILLIN, CEPHALOSPORINS ALLERGY: NO

## 2021-11-22 ENCOUNTER — PRENATAL OFFICE VISIT (OUTPATIENT)
Dept: OBGYN | Facility: CLINIC | Age: 31
End: 2021-11-22
Payer: COMMERCIAL

## 2021-11-22 VITALS
BODY MASS INDEX: 32.27 KG/M2 | DIASTOLIC BLOOD PRESSURE: 66 MMHG | SYSTOLIC BLOOD PRESSURE: 110 MMHG | OXYGEN SATURATION: 99 % | HEART RATE: 94 BPM | WEIGHT: 173.6 LBS

## 2021-11-22 DIAGNOSIS — Z34.03 SUPERVISION OF NORMAL FIRST PREGNANCY IN THIRD TRIMESTER: Primary | ICD-10-CM

## 2021-11-22 DIAGNOSIS — O99.280 HYPOTHYROID IN PREGNANCY, ANTEPARTUM: ICD-10-CM

## 2021-11-22 DIAGNOSIS — O99.019 ANEMIA DURING PREGNANCY: ICD-10-CM

## 2021-11-22 DIAGNOSIS — E03.9 HYPOTHYROID IN PREGNANCY, ANTEPARTUM: ICD-10-CM

## 2021-11-22 PROCEDURE — 99207 PR PRENATAL VISIT: CPT | Performed by: OBSTETRICS & GYNECOLOGY

## 2021-11-22 NOTE — PATIENT INSTRUCTIONS
You may try tums and pepcid.  This is available over the counter.  Please follow the package instructions.

## 2021-11-22 NOTE — PROGRESS NOTES
Presents for routine  appointment.    Reflux.   No abnormal discharge , no leaking fluid , no contractions , no vaginal bleeding    ROS:   and GI  negative.     Please see Prenatal Vitals and Notes Flowsheet for objective data.  Hemoglobin   Date Value Ref Range Status   11/15/2021 10.8 (L) 11.7 - 15.7 g/dL Final   2021 11.7 11.7 - 15.7 g/dL Final   ]   Normal Ferritin  TSH   Date Value Ref Range Status   10/22/2021 1.10 0.40 - 4.00 mU/L Final   2021 1.82 0.40 - 4.00 mU/L Final          A/P:  31 year old  at 37w3d       ICD-10-CM    1. Supervision of normal first pregnancy in third trimester  Z34.03    2. Anemia during pregnancy  O99.019    3. Hypothyroid in pregnancy, antepartum  O99.280     E03.9        Labor precautions given   Group B Strep positive   Follow up in 1 week.      Roxana Mcnulty MD

## 2021-11-26 ENCOUNTER — TELEPHONE (OUTPATIENT)
Dept: OBGYN | Facility: OTHER | Age: 31
End: 2021-11-26
Payer: COMMERCIAL

## 2021-11-26 NOTE — TELEPHONE ENCOUNTER
"Pt called back after counted fetal kicks, states she almost got 10 since the last call 1.5 hours ago but they are not strong and is concerned for lack of movement compared to her \"normal\". Still no concerns for LOF, vaginal bleeding, or consistent contractions.    RN advised L&D for evaluation of decreased fetal movement.  Patient verbalized understanding and agreed to plan.     RN called and gave SBAR to Dr. Bajwa and L&D RN.    Usha Rueda RN on 11/26/2021 at 3:42 PM        "

## 2021-11-26 NOTE — TELEPHONE ENCOUNTER
Pt currently 38w0d, last seen 11/22/2021    Pt having acid reflux for past week, worsening last night. Pt noticed bright red blood in vomit last night. Pt denies abdominal pain or any more vomiting or blood in vomit today. Pt has felt baby move today within last hour or so but states it is different. Pt denies cramping ,contractions LOF or vaginal bleeding.    RN advised fetal kick count and call back in 1 hour if no movement to be seen in L&D. Pt aware to get into a position she feels baby move a lot in to count kicks.    Patient verbalized understanding and agreed to plan.     Patient was given the opportunity to ask additional questions and have them answered. Patient had no further questions.     Usha Rueda RN on 11/26/2021 at 1:53 PM

## 2021-11-29 ENCOUNTER — PRENATAL OFFICE VISIT (OUTPATIENT)
Dept: OBGYN | Facility: CLINIC | Age: 31
End: 2021-11-29
Payer: COMMERCIAL

## 2021-11-29 VITALS
BODY MASS INDEX: 32.81 KG/M2 | HEART RATE: 108 BPM | SYSTOLIC BLOOD PRESSURE: 109 MMHG | WEIGHT: 176.5 LBS | OXYGEN SATURATION: 100 % | DIASTOLIC BLOOD PRESSURE: 70 MMHG

## 2021-11-29 DIAGNOSIS — O48.0 POST-TERM PREGNANCY, 40-42 WEEKS OF GESTATION: ICD-10-CM

## 2021-11-29 DIAGNOSIS — Z34.03 SUPERVISION OF NORMAL FIRST PREGNANCY IN THIRD TRIMESTER: Primary | ICD-10-CM

## 2021-11-29 DIAGNOSIS — R94.6 THYROID FUNCTION TEST ABNORMAL: ICD-10-CM

## 2021-11-29 PROCEDURE — 99207 PR PRENATAL VISIT: CPT | Performed by: OBSTETRICS & GYNECOLOGY

## 2021-11-29 NOTE — PATIENT INSTRUCTIONS
If you have labs or imaging done, the results will automatically release in [employer] without an interpretation.  Your health care professional will review those results and send an interpretation with recommendations as soon as possible, but this may be 1-3 business days.    If you have any questions regarding your visit, please contact your care team.     [facility]: [phone]  [facility] CLINIC HOURS TELEPHONE NUMBER       MD Lora Hoffmann - OSMEL Kerr-MAHESH Marcelino-MAHESH Cameron-MAHESH Zurita-  Ofelia-     Monday- [place]  8:00 a.m - 5:00 p.m    Tuesday- Surgery    Wednesday- Admin    Thursday- [place]  8:00 a.m - 5:00 p.m.    Friday- [place]  7:30 a.m - 4:00 p.m. [facility]  [address]  [place] [address]  [phone] Fax  Imaging [phone]    [facility] Labor and Delivery  [address] [place] [address] Dr.  [place], [place] [address] [phone]    [facility]  [address] [place], [place] [address] [phone] Fax  Imaging [phone]     Urgent Care locations:    [facility] Monday-Friday  10 am - 8 pm  Saturday and Sunday   9 am - 5 pm  Monday-Friday   10 am - 8 pm  Saturday and Sunday   9 am - 5 pm   [phone]     If you need a medication refill, please contact your pharmacy. Please allow 3 business days for your refill to be completed.    As always, thank you for trusting us with your healthcare needs!

## 2021-11-29 NOTE — PROGRESS NOTES
Presents for routine  appointment.     No complaints.    No abnormal discharge , no leaking fluid , no contractions , no vaginal bleeding    ROS:   and GI  negative.     Please see Prenatal Vitals and Notes Flowsheet for objective data.      A/P:  31 year old  at 38w3d       ICD-10-CM    1. Supervision of normal first pregnancy in third trimester  Z34.03    2. Post-term pregnancy, 40-42 weeks of gestation  O48.0 US Fetal Biophys Prof w/o Non Stress Test       Labor precautions given   Follow up in 1 week.      Roxana Mcnulty MD

## 2021-12-01 RX ORDER — LEVOTHYROXINE SODIUM 75 UG/1
75 TABLET ORAL DAILY
Qty: 90 TABLET | Refills: 1 | Status: SHIPPED | OUTPATIENT
Start: 2021-12-01 | End: 2022-06-21

## 2021-12-01 NOTE — TELEPHONE ENCOUNTER
Date last filled 9/6/21  Quantity 90    OB patient last seen 11/29/21    KARINA Foster 12/1/2021

## 2021-12-01 NOTE — TELEPHONE ENCOUNTER
"Requested Prescriptions   Pending Prescriptions Disp Refills     levothyroxine (SYNTHROID/LEVOTHROID) 75 MCG tablet 90 tablet 1     Sig: Take 1 tablet (75 mcg) by mouth daily       Thyroid Protocol Failed - 12/1/2021  3:27 PM        Failed - No active pregnancy on record     If patient is pregnant or has had a positive pregnancy test, please check TSH.          Failed - No positive pregnancy test in past 12 months     If patient is pregnant or has had a positive pregnancy test, please check TSH.          Passed - Patient is 12 years or older        Passed - Recent (12 mo) or future (30 days) visit within the authorizing provider's specialty     Patient has had an office visit with the authorizing provider or a provider within the authorizing providers department within the previous 12 mos or has a future within next 30 days. See \"Patient Info\" tab in inbasket, or \"Choose Columns\" in Meds & Orders section of the refill encounter.              Passed - Medication is active on med list        Passed - Normal TSH on file in past 12 months     Recent Labs   Lab Test 10/22/21  1601   TSH 1.10                 Routing refill request to provider for review/approval because:  Pt is currently pregnant.    Chari Mckeon RN          "

## 2021-12-01 NOTE — TELEPHONE ENCOUNTER
Pending Prescriptions:                       Disp   Refills    levothyroxine (SYNTHROID/LEVOTHROID) 75 MC*90 tab*1        Sig: Take 1 tablet (75 mcg) by mouth daily    Routing refill request to provider for review/approval because:   No active pregnancy on record    No positive pregnancy test in past 12 months

## 2021-12-09 NOTE — PATIENT INSTRUCTIONS
You are scheduled for cervical ripening on Thursday, 12/16/2021, at 6 PM.  Please call [phone] at 5 PM to confirm your arrival time.      If you have labs or imaging done, the results will automatically release in [employer] without an interpretation.  Your health care professional will review those results and send an interpretation with recommendations as soon as possible, but this may be 1-3 business days.    If you have any questions regarding your visit, please contact your care team.     [employer] [facility]: [phone]  [facility] CLINIC HOURS TELEPHONE NUMBER       MD Lora Hoffmann - OSMEL Kerr-MAHESH Marcelino-MAHESH Cameron-MAHESH Zurita-  Ofelia-     Monday- [place]  8:00 a.m - 5:00 p.m    Tuesday- Surgery    Wednesday- Admin    Thursday- [place]  8:00 a.m - 5:00 p.m.    Friday- [place]  7:30 a.m - 4:00 p.m. [facility]  [address]  [place] [address]  [phone] Fax  Imaging [phone]    [facility] Labor and Delivery  [address] [place] [address] Dr.  [place], [place] [address] [phone]    [address] [address]  [phone] Fax  Imaging [phone]     Urgent Care locations:    [facility] Monday-Friday  10 am - 8 pm  Saturday and Sunday   9 am - 5 pm  Monday-Friday   10 am - 8 pm  Saturday and Sunday   9 am - 5 pm   [phone]     If you need a medication refill, please contact your pharmacy. Please allow 3 business days for your refill to be completed.    As always, thank you for trusting us with your healthcare needs!

## 2021-12-10 ENCOUNTER — ANCILLARY PROCEDURE (OUTPATIENT)
Dept: ULTRASOUND IMAGING | Facility: CLINIC | Age: 31
End: 2021-12-10
Attending: OBSTETRICS & GYNECOLOGY
Payer: COMMERCIAL

## 2021-12-10 ENCOUNTER — PRENATAL OFFICE VISIT (OUTPATIENT)
Dept: OBGYN | Facility: CLINIC | Age: 31
End: 2021-12-10
Payer: COMMERCIAL

## 2021-12-10 VITALS
DIASTOLIC BLOOD PRESSURE: 62 MMHG | BODY MASS INDEX: 32.85 KG/M2 | SYSTOLIC BLOOD PRESSURE: 110 MMHG | OXYGEN SATURATION: 99 % | HEART RATE: 82 BPM | WEIGHT: 176.7 LBS

## 2021-12-10 DIAGNOSIS — O48.0 POST-TERM PREGNANCY, 40-42 WEEKS OF GESTATION: ICD-10-CM

## 2021-12-10 DIAGNOSIS — O48.0 POST-TERM PREGNANCY, 40-42 WEEKS OF GESTATION: Primary | ICD-10-CM

## 2021-12-10 PROCEDURE — 76819 FETAL BIOPHYS PROFIL W/O NST: CPT | Performed by: RADIOLOGY

## 2021-12-10 PROCEDURE — 99207 PR PRENATAL VISIT: CPT | Performed by: OBSTETRICS & GYNECOLOGY

## 2021-12-10 NOTE — PROGRESS NOTES
Presents for routine  appointment.     No complaints.    No abnormal discharge , no leaking fluid , no contractions , no vaginal bleeding    ROS:   and GI  negative.     Please see Prenatal Vitals and Notes Flowsheet for objective data.      A/P:  31 year old  at 40w0d       ICD-10-CM    1. Post-term pregnancy, 40-42 weeks of gestation  O48.0        Labor precautions given   BPP  today, await formal read.    Induction of labor discussed.   We discussed cervical ripening .    I discussed the expected timeline for her based on her presentation, but she understands that this is just an approximate.  She is given the opportunity to ask questions and have them answered.  She does wish to proceed  Cervical ripening scheduled for 2021.     Roxana Mcnulty MD

## 2021-12-15 ENCOUNTER — TELEPHONE (OUTPATIENT)
Dept: OBGYN | Facility: OTHER | Age: 31
End: 2021-12-15
Payer: COMMERCIAL

## 2021-12-15 NOTE — TELEPHONE ENCOUNTER
40.5wk. Last seen 12/10/21. No future appts made.    Cervical ripening scheduled on 21.    Concerned w/decreased fetal movement today. Pt is concerned that baby only moved once in 30 min. Did have coffee, & breakfast prior to doing fetal kick counts. States generally takes baby 30 min to move 10 times.    Did have painful contractions 5min apart yesterday am around 6:30am lost mucous plug, baby was moving at that time. Pt states since then, contractions are less intense, and have slowed down.    RN advised being seen in L&D triage at [facility].    Spoke with Dr. Baez for report of above noted findings who agrees with advice.  Spoke with Usha [facility] L&D Charge RN for report as well.    Nisha Chou, GUNJANN RN

## 2022-01-01 NOTE — TELEPHONE ENCOUNTER
Date last filled 1/4/21  Quantity 30   extra refills?    Last seen 10/7/20  No future appointments    KARINA Foster 1/28/2021              Yes

## 2022-01-04 ENCOUNTER — TELEPHONE (OUTPATIENT)
Dept: OBGYN | Facility: CLINIC | Age: 32
End: 2022-01-04
Payer: COMMERCIAL

## 2022-01-04 NOTE — TELEPHONE ENCOUNTER
[facility] Call [facility]    Phone Message    May a detailed message be left on voicemail: yes     Reason for Call: Symptoms or Concerns     If patient has red-flag symptoms, warm transfer to triage line    Current symptom or concern:  Increase in bleeding, cramping, back pain, and abdominal pain.  She is reporting that the bleeding has increased from what it was previous to a little heavier.  She is wondering if this is normal for her post partum recovery.      Symptoms have been present for:  1 day(s)    Has patient previously been seen for this? No    By :     Date:     Are there any new or worsening symptoms? No      Action Taken: Message routed to:  [facility] p [id number]    Travel Screening: Not Applicable

## 2022-01-05 NOTE — TELEPHONE ENCOUNTER
RN called and spoke with patient, relaying provider notes. Patient verbalized understanding and had no further questions.     GUNJAN SteinN RN

## 2022-01-05 NOTE — TELEPHONE ENCOUNTER
Now  last seen for prenatal care on 12/10/21.    C/section 21. Next appt scheduled 22.    Unable to reach patient via phone.  Left message to call clinic back at [phone].    Nisha Chou, BSN RN

## 2022-01-05 NOTE — TELEPHONE ENCOUNTER
Agree with recommendations.  If diarrhea continues despite recommendations, she may need stool evaluation.

## 2022-01-05 NOTE — TELEPHONE ENCOUNTER
Now , last seen for prenatal care 12/10/21.  C/section on 21.  Next appt 22.    Pt returned phone call to clinic.    Pt with 3 concerns.    1. Vaginal bleeding had been light, until yesterday when noticed a gush of blood, states isn't heavy, 50% on large pad, today barely anything. Pt admits to increased activity in last 2 days. RN encouraged rest if vaginal bleeding increases, ED precautions and home monitoring.    2. Abdominal pain near incision site, and low abdominal cramping. Not taking pain meds or Tylenol ibuprofen. States incisional pain is only minimal.  Denies signs of infection related to incision. RN encouraged rest and reiterated normal pain levels with healing from c/section, Tylenol and ibuprofen or heat to abdomen as needed. RN explained cramping could also be due to breast feeding and/or diarrhea. Encouraged home monitoring.    3. Diahrrhea: 4 times a day runny stool for last 3 weeks.  Stopped taking stool softeners 2 weeks ago. Only current medications are PNV and Synthroid.  Denies dietary changes. RN encouraged drinking 80-100oz water day, states is drinking 2-3 liters day.  RN encouraged immodium for loose stools and avoiding caffeine.      Routing to provider for advice not given especially on diarrhea, clinic appt, stool culture or other advice.    GLENNA Stein RN

## 2022-01-24 ENCOUNTER — TELEPHONE (OUTPATIENT)
Dept: OBGYN | Facility: OTHER | Age: 32
End: 2022-01-24
Payer: COMMERCIAL

## 2022-01-24 NOTE — TELEPHONE ENCOUNTER
Pt had a  on .  She is concerned that her incision is infected.  She states she has noticed a foul smell coming from down by her incision.  But she also has some skin folds and has noticed some white substance in her skin folds.  Some pink area around incision but denies redness.  Some warmth around incision.  Denies bleeding or drainage from incision, denies swelling, denies increasing pain, denies fever.    Pt is also noticing a foul smelling vaginal discharge.  She states her vaginal bleeding has decreased a lot and is almost gone.  Denies vaginal itching and burning.    Pt only takes showers.  She has not been good about keeping herself clean or keeping insicion and skin folds dry.    Advised to take a bath with Epson salts and clean inside skin folds well with soap and washcloth.  After bath rinse body well with warm water and make sure rinse vaginal area and inside skin folds well with warm water.  After bath/shower make sure drying inside skin folds well and incision well.  Try to keep skin folds and incision clean and dry throughout the day.  If after doing this pt continues to notice a foul vaginal smell and/or foul smell from area of incision then she will call back and we will schedule an appointment to be further assessed for vaginal and/or incisional infection.    Chari Mckeon RN

## 2022-01-31 ENCOUNTER — PRENATAL OFFICE VISIT (OUTPATIENT)
Dept: OBGYN | Facility: CLINIC | Age: 32
End: 2022-01-31
Payer: COMMERCIAL

## 2022-01-31 VITALS
SYSTOLIC BLOOD PRESSURE: 105 MMHG | DIASTOLIC BLOOD PRESSURE: 70 MMHG | HEART RATE: 86 BPM | WEIGHT: 160.5 LBS | BODY MASS INDEX: 29.84 KG/M2

## 2022-01-31 DIAGNOSIS — O99.019 ANEMIA DURING PREGNANCY: ICD-10-CM

## 2022-01-31 DIAGNOSIS — R94.6 THYROID FUNCTION TEST ABNORMAL: ICD-10-CM

## 2022-01-31 DIAGNOSIS — O99.280 HYPOTHYROID IN PREGNANCY, ANTEPARTUM: ICD-10-CM

## 2022-01-31 DIAGNOSIS — E03.9 HYPOTHYROID IN PREGNANCY, ANTEPARTUM: ICD-10-CM

## 2022-01-31 PROBLEM — Z34.00 PRENATAL CARE, FIRST PREGNANCY: Status: RESOLVED | Noted: 2021-04-15 | Resolved: 2022-01-31

## 2022-01-31 LAB
HGB BLD-MCNC: 12.4 G/DL (ref 11.7–15.7)
TSH SERPL DL<=0.005 MIU/L-ACNC: 1.53 MU/L (ref 0.4–4)

## 2022-01-31 PROCEDURE — 36415 COLL VENOUS BLD VENIPUNCTURE: CPT | Performed by: OBSTETRICS & GYNECOLOGY

## 2022-01-31 PROCEDURE — 85018 HEMOGLOBIN: CPT | Performed by: OBSTETRICS & GYNECOLOGY

## 2022-01-31 PROCEDURE — 84443 ASSAY THYROID STIM HORMONE: CPT | Performed by: OBSTETRICS & GYNECOLOGY

## 2022-01-31 PROCEDURE — 99207 PR POST PARTUM EXAM: CPT | Performed by: OBSTETRICS & GYNECOLOGY

## 2022-01-31 RX ORDER — IBUPROFEN 600 MG/1
600 TABLET, FILM COATED ORAL
COMMUNITY
Start: 2021-12-19 | End: 2022-01-31

## 2022-01-31 RX ORDER — ACETAMINOPHEN 500 MG
1000 TABLET ORAL
COMMUNITY
Start: 2021-12-19 | End: 2022-01-31

## 2022-01-31 ASSESSMENT — PATIENT HEALTH QUESTIONNAIRE - PHQ9
10. IF YOU CHECKED OFF ANY PROBLEMS, HOW DIFFICULT HAVE THESE PROBLEMS MADE IT FOR YOU TO DO YOUR WORK, TAKE CARE OF THINGS AT HOME, OR GET ALONG WITH OTHER PEOPLE: NOT DIFFICULT AT ALL
SUM OF ALL RESPONSES TO PHQ QUESTIONS 1-9: 0
SUM OF ALL RESPONSES TO PHQ QUESTIONS 1-9: 0

## 2022-01-31 NOTE — PROGRESS NOTES
SUBJECTIVE:  31 year old  here for a 6-week postpartum checkup.         OB History    Para Term  AB Living   1 1 1 0 0 1   SAB IAB Ectopic Multiple Live Births   0 0 0 0 1      # Outcome Date GA Lbr Leon/2nd Weight Sex Delivery Anes PTL Lv   1 Term 21 41w0d 23:53 / 03:51 4.12 kg (9 lb 1.3 oz) M CS-Unspec EPI N DANA      Complications: Failure to Progress in Second Stage, Chorioamnionitis, Labor and delivery complicated by meconium in amniotic fluid      Name: RHIANNA ZAVALA BOY      Apgar1: 8  Apgar5: 9       Complications: none    Since delivery, she has been breast feeding.  She has no abnormal discharge or pain.  She continues to bleed a little bit but this seems to be slowing down.  She had stopped for period of time and then it restarted.  Patient screened for postpartum depression. Has good support system in place.    Lab Results   Component Value Date    PAP NIL 2020         EXAM:  /70 (BP Location: Right arm, Cuff Size: Adult Regular)   Pulse 86   Wt 72.8 kg (160 lb 8 oz)   LMP 2021 (Exact Date)   Breastfeeding Unknown   BMI 29.84 kg/m       General: alert, healthy appearing woman  HEENT: normal  Abdomen: soft, nontender, no palpable masses.  Incision is healing well.  Pelvic exam:    Deferred    ASSESSMENT:  Normal postpartum exam  Hypothyroidism  Baby with congenital hypothyroidism  History of   History of anemia in pregnancy    PLAN:  1. Contraceptive options reviewed; patient wishes to utilize: Condoms.  2. Continue with annual health maintenance exams.   3.  Discussed healthy pregnancy interval  4.  Plan TSH and hemoglobin today    Roxana Mcnulty MD      Answers for HPI/ROS submitted by the patient on 2022  If you checked off any problems, how difficult have these problems made it for you to do your work, take care of things at home, or get along with other people?: Not difficult at all  PHQ9 TOTAL SCORE: 0

## 2022-02-01 ASSESSMENT — PATIENT HEALTH QUESTIONNAIRE - PHQ9: SUM OF ALL RESPONSES TO PHQ QUESTIONS 1-9: 0

## 2022-06-21 ENCOUNTER — TELEPHONE (OUTPATIENT)
Dept: OBGYN | Facility: OTHER | Age: 32
End: 2022-06-21
Payer: COMMERCIAL

## 2022-06-21 NOTE — TELEPHONE ENCOUNTER
Called patient and informed of message for Dr. Mcnulty.  [place] state [place], but when she gets back she will follow up with Dr. Kasper.    Yenni Shields, [facility]  June 21, 2022

## 2022-06-21 NOTE — TELEPHONE ENCOUNTER
"Please refer to my result note from January - \"recommend you make an appointment with Dr. Kasper regarding thyroid medication management in the next 2 months or so. \"    I have placed the refill orders but the patient needs resume care with Dr. Kasper now that she is no longer pregnant.    "

## 2022-09-18 ENCOUNTER — HEALTH MAINTENANCE LETTER (OUTPATIENT)
Age: 32
End: 2022-09-18

## 2023-02-03 ENCOUNTER — OFFICE VISIT (OUTPATIENT)
Dept: FAMILY MEDICINE | Facility: CLINIC | Age: 33
End: 2023-02-03
Payer: COMMERCIAL

## 2023-02-03 ENCOUNTER — MYC MEDICAL ADVICE (OUTPATIENT)
Dept: FAMILY MEDICINE | Facility: CLINIC | Age: 33
End: 2023-02-03

## 2023-02-03 VITALS
DIASTOLIC BLOOD PRESSURE: 73 MMHG | TEMPERATURE: 97.5 F | RESPIRATION RATE: 18 BRPM | HEART RATE: 90 BPM | OXYGEN SATURATION: 98 % | WEIGHT: 148.3 LBS | SYSTOLIC BLOOD PRESSURE: 106 MMHG | HEIGHT: 62 IN | BODY MASS INDEX: 27.29 KG/M2

## 2023-02-03 DIAGNOSIS — Z11.59 NEED FOR HEPATITIS C SCREENING TEST: ICD-10-CM

## 2023-02-03 DIAGNOSIS — Z00.00 ROUTINE GENERAL MEDICAL EXAMINATION AT A HEALTH CARE FACILITY: Primary | ICD-10-CM

## 2023-02-03 DIAGNOSIS — Z13.1 SCREENING FOR DIABETES MELLITUS (DM): ICD-10-CM

## 2023-02-03 DIAGNOSIS — E03.9 ACQUIRED HYPOTHYROIDISM: ICD-10-CM

## 2023-02-03 DIAGNOSIS — Z13.0 SCREENING FOR DEFICIENCY ANEMIA: ICD-10-CM

## 2023-02-03 DIAGNOSIS — R82.71 BACTERIURIA: ICD-10-CM

## 2023-02-03 DIAGNOSIS — R94.6 THYROID FUNCTION TEST ABNORMAL: ICD-10-CM

## 2023-02-03 DIAGNOSIS — R10.32 LLQ ABDOMINAL PAIN: ICD-10-CM

## 2023-02-03 LAB
ALBUMIN UR-MCNC: NEGATIVE MG/DL
ANION GAP SERPL CALCULATED.3IONS-SCNC: 7 MMOL/L (ref 3–14)
APPEARANCE UR: CLEAR
BACTERIA #/AREA URNS HPF: ABNORMAL /HPF
BILIRUB UR QL STRIP: NEGATIVE
BUN SERPL-MCNC: 15 MG/DL (ref 7–30)
CALCIUM SERPL-MCNC: 9.3 MG/DL (ref 8.5–10.1)
CHLORIDE BLD-SCNC: 108 MMOL/L (ref 94–109)
CO2 SERPL-SCNC: 27 MMOL/L (ref 20–32)
COLOR UR AUTO: YELLOW
CREAT SERPL-MCNC: 0.69 MG/DL (ref 0.52–1.04)
ERYTHROCYTE [DISTWIDTH] IN BLOOD BY AUTOMATED COUNT: 12.1 % (ref 10–15)
GFR SERPL CREATININE-BSD FRML MDRD: >90 ML/MIN/1.73M2
GLUCOSE BLD-MCNC: 112 MG/DL (ref 70–99)
GLUCOSE UR STRIP-MCNC: NEGATIVE MG/DL
HBA1C MFR BLD: 5.5 % (ref 0–5.6)
HCT VFR BLD AUTO: 40.5 % (ref 35–47)
HGB BLD-MCNC: 12.3 G/DL (ref 11.7–15.7)
HGB UR QL STRIP: NEGATIVE
KETONES UR STRIP-MCNC: NEGATIVE MG/DL
LEUKOCYTE ESTERASE UR QL STRIP: NEGATIVE
MCH RBC QN AUTO: 30.4 PG (ref 26.5–33)
MCHC RBC AUTO-ENTMCNC: 30.4 G/DL (ref 31.5–36.5)
MCV RBC AUTO: 100 FL (ref 78–100)
NITRATE UR QL: NEGATIVE
PH UR STRIP: 6.5 [PH] (ref 5–7)
PLATELET # BLD AUTO: 316 10E3/UL (ref 150–450)
POTASSIUM BLD-SCNC: 4.1 MMOL/L (ref 3.4–5.3)
RBC # BLD AUTO: 4.04 10E6/UL (ref 3.8–5.2)
RBC #/AREA URNS AUTO: ABNORMAL /HPF
SODIUM SERPL-SCNC: 142 MMOL/L (ref 133–144)
SP GR UR STRIP: 1.02 (ref 1–1.03)
SQUAMOUS #/AREA URNS AUTO: ABNORMAL /LPF
TSH SERPL DL<=0.005 MIU/L-ACNC: 1.54 MU/L (ref 0.4–4)
UROBILINOGEN UR STRIP-ACNC: 0.2 E.U./DL
WBC # BLD AUTO: 9.7 10E3/UL (ref 4–11)
WBC #/AREA URNS AUTO: ABNORMAL /HPF

## 2023-02-03 PROCEDURE — 81001 URINALYSIS AUTO W/SCOPE: CPT | Performed by: FAMILY MEDICINE

## 2023-02-03 PROCEDURE — 80048 BASIC METABOLIC PNL TOTAL CA: CPT | Performed by: FAMILY MEDICINE

## 2023-02-03 PROCEDURE — 90472 IMMUNIZATION ADMIN EACH ADD: CPT | Performed by: FAMILY MEDICINE

## 2023-02-03 PROCEDURE — 86803 HEPATITIS C AB TEST: CPT | Performed by: FAMILY MEDICINE

## 2023-02-03 PROCEDURE — 99214 OFFICE O/P EST MOD 30 MIN: CPT | Mod: 25 | Performed by: FAMILY MEDICINE

## 2023-02-03 PROCEDURE — 84443 ASSAY THYROID STIM HORMONE: CPT | Performed by: FAMILY MEDICINE

## 2023-02-03 PROCEDURE — 85027 COMPLETE CBC AUTOMATED: CPT | Performed by: FAMILY MEDICINE

## 2023-02-03 PROCEDURE — 83036 HEMOGLOBIN GLYCOSYLATED A1C: CPT | Performed by: FAMILY MEDICINE

## 2023-02-03 PROCEDURE — 99395 PREV VISIT EST AGE 18-39: CPT | Mod: 25 | Performed by: FAMILY MEDICINE

## 2023-02-03 PROCEDURE — 90686 IIV4 VACC NO PRSV 0.5 ML IM: CPT | Performed by: FAMILY MEDICINE

## 2023-02-03 PROCEDURE — 36415 COLL VENOUS BLD VENIPUNCTURE: CPT | Performed by: FAMILY MEDICINE

## 2023-02-03 PROCEDURE — 90471 IMMUNIZATION ADMIN: CPT | Performed by: FAMILY MEDICINE

## 2023-02-03 PROCEDURE — 90746 HEPB VACCINE 3 DOSE ADULT IM: CPT | Performed by: FAMILY MEDICINE

## 2023-02-03 RX ORDER — LEVOTHYROXINE SODIUM 75 UG/1
75 TABLET ORAL DAILY
Qty: 90 TABLET | Refills: 3 | Status: SHIPPED | OUTPATIENT
Start: 2023-02-03 | End: 2024-02-16

## 2023-02-03 ASSESSMENT — ENCOUNTER SYMPTOMS
PALPITATIONS: 0
CHILLS: 0
DIZZINESS: 0
DIARRHEA: 0
JOINT SWELLING: 0
HEARTBURN: 0
HEMATOCHEZIA: 0
NERVOUS/ANXIOUS: 1
EYE PAIN: 0
MYALGIAS: 0
PARESTHESIAS: 0
SORE THROAT: 0
HEADACHES: 0
FREQUENCY: 0
ABDOMINAL PAIN: 1
ARTHRALGIAS: 0
COUGH: 0
HEMATURIA: 0
FEVER: 0
WEAKNESS: 1
CONSTIPATION: 0
NAUSEA: 0
SHORTNESS OF BREATH: 0
BREAST MASS: 0

## 2023-02-03 NOTE — PROGRESS NOTES
SUBJECTIVE:   CC: Patience is an 32 year old who presents for preventive health visit.   Left lower quadrant abdominal pain-complaining of intermittent pain in lower abdomen for the past few months with no associated concerns for constipation, diarrhea, nausea, vomiting, fever, chills, UTI symptoms, hematuria, abnormal bleeding  Patient is  1 para 1, last childbirth-13 months ago by   Patient is not breast-feeding  Has regular menstrual cycles using condoms for contraception,  Denies previous history of UTI, PID, ovarian cyst  Past medical history significant for hypothyroidism    Patient has been advised of split billing requirements and indicates understanding: Yes     Healthy Habits:     Getting at least 3 servings of Calcium per day:  NO    Bi-annual eye exam:  NO    Dental care twice a year:  NO    Sleep apnea or symptoms of sleep apnea:  None    Diet:  Regular (no restrictions) and Breakfast skipped    Frequency of exercise:  None    Taking medications regularly:  Yes    Medication side effects:  None    PHQ-2 Total Score: 0    Additional concerns today:  No          Hypothyroidism Follow-up      Since last visit, patient describes the following symptoms: Weight stable, no hair loss, no skin changes, no constipation, no loose stools   Patient is here for a physical today. She is needing her thyroid levels re-checked.     Patient gave birth to a baby boy 2021 via .     Patient reports lately she's been skipping breakfast and has been having some abdominal pain but isn't sure if the 2 are related.       Today's PHQ-2 Score:   PHQ-2 (  [employer]) 2/3/2023   Q1: Little interest or pleasure in doing things 0   Q2: Feeling down, depressed or hopeless 0   PHQ-2 Score 0   PHQ-2 Total Score (12-17 Years)- Positive if 3 or more points; Administer PHQ-A if positive -   Q1: Little interest or pleasure in doing things Not at all   Q2: Feeling down, depressed or hopeless Not at all    PHQ-2 Score 0       Have you ever done Advance Care Planning? (For example, a Health Directive, POLST, or a discussion with a medical provider or your loved ones about your wishes): No, advance care planning information given to patient to review.  Patient declined advance care planning discussion at this time.    Social History     Tobacco Use     Smoking status: Never     Smokeless tobacco: Never   Substance Use Topics     Alcohol use: Not Currently     Alcohol/week: 2.0 standard drinks     Types: 2 Standard drinks or equivalent per week     Comment: ocassionally     If you drink alcohol do you typically have >3 drinks per day or >7 drinks per week? No    Alcohol Use 2/3/2023   Prescreen: >3 drinks/day or >7 drinks/week? No   Prescreen: >3 drinks/day or >7 drinks/week? -   No flowsheet data found.    Reviewed orders with patient.  Reviewed health maintenance and updated orders accordingly - No  Lab work is in process  Labs reviewed in EPIC  BP Readings from Last 3 Encounters:   23 106/73   22 105/70   12/10/21 110/62    Wt Readings from Last 3 Encounters:   23 67.3 kg (148 lb 4.8 oz)   22 72.8 kg (160 lb 8 oz)   12/10/21 80.2 kg (176 lb 11.2 oz)                  Patient Active Problem List   Diagnosis     Seasonal allergic rhinitis due to other allergic trigger     Episodic tension-type headache, not intractable     Neck muscle spasm     Family history of thyroid disease     Thyroid function test abnormal     Past Surgical History:   Procedure Laterality Date      SECTION         Social History     Tobacco Use     Smoking status: Never     Smokeless tobacco: Never   Substance Use Topics     Alcohol use: Not Currently     Alcohol/week: 2.0 standard drinks     Types: 2 Standard drinks or equivalent per week     Comment: ocassionally     Family History   Problem Relation Age of Onset     Diabetes Mother      Hypertension Mother      Thyroid Disease Father      Hyperlipidemia Father       Asthma Brother      Chronic Obstructive Pulmonary Disease Brother      Diabetes Paternal Grandmother      Diabetes Maternal Grandmother      Hyperlipidemia Maternal Grandfather      Diabetes Maternal Grandfather      Dementia Maternal Grandfather      Diabetes Paternal Grandfather      Hyperlipidemia Paternal Grandfather      Diabetes Paternal Aunt      Diabetes Paternal Aunt      Heart Disease Paternal Aunt      Obesity Paternal Aunt          Current Outpatient Medications   Medication Sig Dispense Refill     levothyroxine (SYNTHROID/LEVOTHROID) 75 MCG tablet Take 1 tablet (75 mcg) by mouth daily 90 tablet 0     Prenatal Vit-Fe Fumarate-FA (PRENATAL PO)        Allergies   Allergen Reactions     Sodium Sulfite Rash     Wine [Alcohol] Hives, Itching and Rash     Recent Labs   Lab Test 22  0918 10/22/21  1601 20  1549 20  1100   LDL  --   --   --  90   HDL  --   --   --  85   TRIG  --   --   --  52   TSH 1.53 1.10   < > 6.95*    < > = values in this interval not displayed.        Breast Cancer Screening:    Breast CA Risk Assessment ([facility]-7) 2/3/2023   Do you have a family history of breast, colon, or ovarian cancer? No / Unknown       click delete button to remove this line now  Patient under 40 years of age: Routine Mammogram Screening not recommended.   Pertinent mammograms are reviewed under the imaging tab.    History of abnormal Pap smear: NO - age 30-65 PAP every 5 years with negative HPV co-testing recommended  PAP / HPV 2020   PAP (Historical) NIL     Reviewed and updated as needed this visit by clinical staff    Allergies               Reviewed and updated as needed this visit by Provider                   Past Medical History:   Diagnosis Date     Frequent headaches      Thyroid disease       Past Surgical History:   Procedure Laterality Date      SECTION       OB History    Para Term  AB Living   1 1 1 0 0 1   SAB IAB Ectopic Multiple Live Births   0 0 0  0 1      # Outcome Date GA Lbr Leon/2nd Weight Sex Delivery Anes PTL Lv   1 Term 12/17/21 41w0d 23:53 / 03:51 4.12 kg (9 lb 1.3 oz) M CS-Unspec EPI N DANA      Complications: Failure to Progress in Second Stage, Chorioamnionitis, Labor and delivery complicated by meconium in amniotic fluid      Name: SALLY,BABY BOY      Apgar1: 8  Apgar5: 9       Review of Systems   Constitutional: Negative for chills and fever.   HENT: Negative for congestion, ear pain, hearing loss and sore throat.    Eyes: Negative for pain and visual disturbance.   Respiratory: Negative for cough and shortness of breath.    Cardiovascular: Negative for chest pain, palpitations and peripheral edema.   Gastrointestinal: Positive for abdominal pain. Negative for constipation, diarrhea, heartburn, hematochezia and nausea.   Breasts:  Negative for tenderness, breast mass and discharge.   Genitourinary: Positive for pelvic pain. Negative for frequency, genital sores, hematuria, urgency, vaginal bleeding and vaginal discharge.   Musculoskeletal: Negative for arthralgias, joint swelling and myalgias.   Skin: Negative for rash.   Neurological: Positive for weakness. Negative for dizziness, headaches and paresthesias.   Psychiatric/Behavioral: Negative for mood changes. The patient is nervous/anxious.           CONSTITUTIONAL: NEGATIVE for fever, chills, change in weight  INTEGUMENTARU/SKIN: NEGATIVE for worrisome rashes, moles or lesions  EYES: NEGATIVE for vision changes or irritation  ENT: NEGATIVE for ear, mouth and throat problems  RESP: NEGATIVE for significant cough or SOB  BREAST: NEGATIVE for masses, tenderness or discharge  CV: NEGATIVE for chest pain, palpitations or peripheral edema  GI: as above  : NEGATIVE for unusual urinary or vaginal symptoms. Periods are regular.  MUSCULOSKELETAL: NEGATIVE for significant arthralgias or myalgia  NEURO: NEGATIVE for weakness, dizziness or paresthesias  ENDOCRINE: NEGATIVE for temperature intolerance,  "skin/hair changes  ENDOCRINE: History of hypothyroidism  HEME/ALLERGY/IMMUNE: NEGATIVE for bleeding problems  PSYCHIATRIC: NEGATIVE for changes in mood or affect     OBJECTIVE:   /73 (BP Location: Right arm, Patient Position: Chair, Cuff Size: Adult Regular)   Pulse 90   Temp 97.5  F (36.4  C) (Oral)   Resp 18   Ht 1.562 m (5' 1.5\")   Wt 67.3 kg (148 lb 4.8 oz)   LMP 01/20/2023 (Exact Date)   SpO2 98%   Breastfeeding No   BMI 27.57 kg/m    Physical Exam  GENERAL: healthy, alert and no distress  EYES: Eyes grossly normal to inspection, PERRL and conjunctivae and sclerae normal  HENT: ear canals and TM's normal, nose and mouth without ulcers or lesions  NECK: no adenopathy, no asymmetry, masses, or scars and thyroid normal to palpation  RESP: lungs clear to auscultation - no rales, rhonchi or wheezes  BREAST: normal without masses, tenderness or nipple discharge and no palpable axillary masses or adenopathy  CV: regular rate and rhythm, normal S1 S2, no S3 or S4, no murmur, click or rub, no peripheral edema and peripheral pulses strong  ABDOMEN: soft, minimal to moderate left lower quadrant tenderness with no guarding or rigidity, no organomegaly, normal BS, no costovertebral angle tenderness  MS: no gross musculoskeletal defects noted, no edema  SKIN: no suspicious lesions or rashes  NEURO: Normal strength and tone, mentation intact and speech normal  PSYCH: mentation appears normal, affect normal/bright    Diagnostic Test Results:  Labs reviewed in Epic    ASSESSMENT/PLAN:   (Z00.00) Routine general medical examination at a health care facility  (primary encounter diagnosis)  Comment:   Plan: Discussed on regular exercises, healthy eating, self breast exams monthly and routine dental checks.      (E03.9) Acquired hypothyroidism  Comment: Patient is currently taking levothyroxine 75 MCG daily  Plan: TSH with free T4 reflex          TSH   Date Value Ref Range Status   02/03/2023 1.54 0.40 - 4.00 mU/L " Final   01/31/2022 1.53 0.40 - 4.00 mU/L Final   10/22/2021 1.10 0.40 - 4.00 mU/L Final   08/27/2021 1.16 0.40 - 4.00 mU/L Final   06/25/2021 1.82 0.40 - 4.00 mU/L Final   05/21/2021 2.15 0.40 - 4.00 mU/L Final   04/16/2021 4.22 (H) 0.40 - 4.00 mU/L Final   01/04/2021 1.83 0.40 - 4.00 mU/L Final   12/02/2020 3.81 0.40 - 4.00 mU/L Final     Reviewed normal thyroid labs, continue current dose of levo thyroxine, refills were sent today.    (R10.32) LLQ abdominal pain  Comment:   Plan: UA with Microscopic reflex to Culture - lab         collect, US Pelvic Complete with Transvaginal        ddx-constipation/UTI/ovarian cyst/nonspecific  Recommended to get a urine exam today  If negative, will proceed with a pelvic ultrasound for further evaluation  Will f/u on results and call with recommendations.  Patient verbalised understanding and is agreeable to the plan.      (Z13.0) Screening for deficiency anemia  Comment:   Plan: CBC with platelets            (Z11.59) Need for hepatitis C screening test  Comment:   Plan: Hepatitis C antibody            (Z13.1) Screening for diabetes mellitus (DM)  Comment:   Plan: Hemoglobin A1c, Basic metabolic panel  (Ca, Cl,        CO2, Creat, Gluc, K, Na, BUN)                    COUNSELING:  Reviewed preventive health counseling, as reflected in patient instructions  Special attention given to:        Regular exercise       Healthy diet/nutrition       Vision screening       Immunizations    Vaccinated for: Hepatitis B and Influenza and Declined: Covid-19 due to Concerns about side effects/safety               Contraception       Consider Hep C screening for all patients one time for ages 18-79 years        She reports that she has never smoked. She has never used smokeless tobacco.          Terrance Kasper MD  [facility]

## 2023-02-03 NOTE — PATIENT INSTRUCTIONS
Call  to schedule for pelvic scan  Preventive Health Recommendations  Female Ages 26 - 39  Yearly exam:   See your health care provider every year in order to  Review health changes.   Discuss preventive care.    Review your medicines if you your doctor has prescribed any.    Until age 30: Get a Pap test every three years (more often if you have had an abnormal result).    After age 30: Talk to your doctor about whether you should have a Pap test every 3 years or have a Pap test with HPV screening every 5 years.   You do not need a Pap test if your uterus was removed (hysterectomy) and you have not had cancer.  You should be tested each year for STDs (sexually transmitted diseases), if you're at risk.   Talk to your provider about how often to have your cholesterol checked.  If you are at risk for diabetes, you should have a diabetes test (fasting glucose).  Shots: Get a flu shot each year. Get a tetanus shot every 10 years.   Nutrition:   Eat at least 5 servings of fruits and vegetables each day.  Eat whole-grain bread, whole-wheat pasta and brown rice instead of white grains and rice.  Get adequate Calcium and Vitamin D.     Lifestyle  Exercise at least 150 minutes a week (30 minutes a day, 5 days of the week). This will help you control your weight and prevent disease.  Limit alcohol to one drink per day.  No smoking.   Wear sunscreen to prevent skin cancer.  See your dentist every six months for an exam and cleaning.

## 2023-02-06 LAB — HCV AB SERPL QL IA: NONREACTIVE

## 2023-02-06 NOTE — TELEPHONE ENCOUNTER
Please see LAVEGO message below. Routing to provider to review and advise.    Jennifer Zavaleta RN    [facility]

## 2023-12-26 ENCOUNTER — ANCILLARY PROCEDURE (OUTPATIENT)
Dept: ULTRASOUND IMAGING | Facility: CLINIC | Age: 33
End: 2023-12-26
Attending: FAMILY MEDICINE
Payer: COMMERCIAL

## 2023-12-26 DIAGNOSIS — R10.32 LLQ ABDOMINAL PAIN: ICD-10-CM

## 2023-12-26 PROCEDURE — 76856 US EXAM PELVIC COMPLETE: CPT

## 2023-12-26 PROCEDURE — 76830 TRANSVAGINAL US NON-OB: CPT

## 2024-02-07 SDOH — HEALTH STABILITY: PHYSICAL HEALTH: ON AVERAGE, HOW MANY DAYS PER WEEK DO YOU ENGAGE IN MODERATE TO STRENUOUS EXERCISE (LIKE A BRISK WALK)?: 2 DAYS

## 2024-02-07 SDOH — HEALTH STABILITY: PHYSICAL HEALTH: ON AVERAGE, HOW MANY MINUTES DO YOU ENGAGE IN EXERCISE AT THIS LEVEL?: 20 MIN

## 2024-02-07 ASSESSMENT — SOCIAL DETERMINANTS OF HEALTH (SDOH): HOW OFTEN DO YOU GET TOGETHER WITH FRIENDS OR RELATIVES?: TWICE A WEEK

## 2024-02-09 ENCOUNTER — OFFICE VISIT (OUTPATIENT)
Dept: FAMILY MEDICINE | Facility: CLINIC | Age: 34
End: 2024-02-09
Payer: COMMERCIAL

## 2024-02-09 VITALS
DIASTOLIC BLOOD PRESSURE: 75 MMHG | BODY MASS INDEX: 26.38 KG/M2 | WEIGHT: 139.7 LBS | RESPIRATION RATE: 16 BRPM | OXYGEN SATURATION: 100 % | HEART RATE: 78 BPM | SYSTOLIC BLOOD PRESSURE: 107 MMHG | HEIGHT: 61 IN | TEMPERATURE: 98 F

## 2024-02-09 DIAGNOSIS — L81.9 CHANGING PIGMENTED SKIN LESION: ICD-10-CM

## 2024-02-09 DIAGNOSIS — Z13.220 SCREENING FOR HYPERLIPIDEMIA: ICD-10-CM

## 2024-02-09 DIAGNOSIS — Z13.0 SCREENING FOR DEFICIENCY ANEMIA: ICD-10-CM

## 2024-02-09 DIAGNOSIS — Z12.4 CERVICAL CANCER SCREENING: ICD-10-CM

## 2024-02-09 DIAGNOSIS — Z00.00 ROUTINE GENERAL MEDICAL EXAMINATION AT A HEALTH CARE FACILITY: Primary | ICD-10-CM

## 2024-02-09 DIAGNOSIS — D25.2 SUBSEROUS LEIOMYOMA OF UTERUS: ICD-10-CM

## 2024-02-09 DIAGNOSIS — E03.9 ACQUIRED HYPOTHYROIDISM: ICD-10-CM

## 2024-02-09 DIAGNOSIS — R94.6 THYROID FUNCTION TEST ABNORMAL: ICD-10-CM

## 2024-02-09 DIAGNOSIS — Z13.1 SCREENING FOR DIABETES MELLITUS (DM): ICD-10-CM

## 2024-02-09 PROCEDURE — 90472 IMMUNIZATION ADMIN EACH ADD: CPT | Performed by: FAMILY MEDICINE

## 2024-02-09 PROCEDURE — G0145 SCR C/V CYTO,THINLAYER,RESCR: HCPCS | Performed by: FAMILY MEDICINE

## 2024-02-09 PROCEDURE — 90686 IIV4 VACC NO PRSV 0.5 ML IM: CPT | Performed by: FAMILY MEDICINE

## 2024-02-09 PROCEDURE — 90746 HEPB VACCINE 3 DOSE ADULT IM: CPT | Performed by: FAMILY MEDICINE

## 2024-02-09 PROCEDURE — 90471 IMMUNIZATION ADMIN: CPT | Performed by: FAMILY MEDICINE

## 2024-02-09 PROCEDURE — 99213 OFFICE O/P EST LOW 20 MIN: CPT | Mod: 25 | Performed by: FAMILY MEDICINE

## 2024-02-09 PROCEDURE — 99395 PREV VISIT EST AGE 18-39: CPT | Mod: 25 | Performed by: FAMILY MEDICINE

## 2024-02-09 PROCEDURE — 87624 HPV HI-RISK TYP POOLED RSLT: CPT | Performed by: FAMILY MEDICINE

## 2024-02-09 ASSESSMENT — PAIN SCALES - GENERAL: PAINLEVEL: NO PAIN (0)

## 2024-02-09 NOTE — PROGRESS NOTES
Preventive Care Visit  [facility]  Terrance Kasper MD, Family Medicine  Feb 9, 2024    Assessment & Plan     Routine general medical examination at a health care facility  Discussed on regular exercises, healthy eating, and routine dental checks.    Acquired hypothyroidism  Patient wants to have the labs done at the [facility] on Monday  Currently on levothyroxine 75 mcg daily  Will f/u on results and and send the refills accordingly  Patient is in the process of planning for her second child  Recommended to start taking prenatal vitamins for at least 2 to 3 months before planning  She will call to schedule for a virtual or office visit once she confirms pregnancy to discuss about thyroid management during pregnancy  Patient verbalised understanding and is agreeable to the plan.  TSH   Date Value Ref Range Status   02/16/2024 3.38 0.30 - 4.20 uIU/mL Final   02/03/2023 1.54 0.40 - 4.00 mU/L Final   01/31/2022 1.53 0.40 - 4.00 mU/L Final   10/22/2021 1.10 0.40 - 4.00 mU/L Final   08/27/2021 1.16 0.40 - 4.00 mU/L Final   06/25/2021 1.82 0.40 - 4.00 mU/L Final   05/21/2021 2.15 0.40 - 4.00 mU/L Final   04/16/2021 4.22 (H) 0.40 - 4.00 mU/L Final   01/04/2021 1.83 0.40 - 4.00 mU/L Final   12/02/2020 3.81 0.40 - 4.00 mU/L Final     Reviewed normal thyroid labs, continue current dose of levothyroxine, refill sent today        - TSH WITH FREE T4 REFLEX; Future  -Levothyroxine 75 mcg tablet, take 1 tablet by mouth daily      Subserosal uterine fibroid  Reviewed pelvic scan from December 2023 showing subcentimeter small subserosal fibroid in the uterine body patient has questions about her concerns with future planning for children and the fibroid  Discussed with patient on possible concerns including no concerns versus fertility issues, miscarriages with fibroid  We will address that when it happens  Other plan as mentioned above    Changing pigmented skin lesion-chin  Due to recent change  "including increase in size, itching and drainage recommended to consult dermatologist for further evaluation  - Adult Dermatology  Referral; Future    Screening for deficiency anemia    - CBC with platelets; Future    Cervical cancer screening    - Pap Screen with HPV - recommended age 30 - 65 years    Screening for diabetes mellitus (DM)    - Glucose; Future    Screening for hyperlipidemia    - Lipid panel reflex to direct LDL Fasting; Future        Review of the result(s) of each unique test - pelvic ultrasound from 12/26/2023        BMI  Estimated body mass index is 26.4 kg/m  as calculated from the following:    Height as of this encounter: 1.549 m (5' 1\").    Weight as of this encounter: 63.4 kg (139 lb 11.2 oz).   Weight management plan: Continue with healthy eating, portion control, regular exercises    Counseling  Appropriate preventive services were discussed with this patient, including applicable screening as appropriate for fall prevention, nutrition, physical activity, weight loss and cognition.  Checklist reviewing preventive services available has been given to the patient.  Reviewed patient's diet, addressing concerns and/or questions.   She is at risk for lack of exercise and has been provided with information to increase physical activity for the benefit of her well-being.   She is at risk for psychosocial distress and has been provided with information to reduce risk.         Chart documentation done in part with Dragon Voice recognition Software. Although reviewed after completion, some word and grammatical error may remain.    See Patient Instructions    Ritesh Morin is a 33 year old, presenting for the following:  Physical        2/9/2024     3:21 PM   Additional Questions   Roomed by Roxanne MUNGUIA         2/9/2024     3:21 PM   Patient Reported Additional Medications   Patient reports taking the following new medications None        Health Care Directive  Patient does not have a " Health Care Directive or Living Will: NO    HPI      Hypothyroidism Follow-up    Since last visit, patient describes the following symptoms: Weight stable, no hair loss, no skin changes, no constipation, no loose stools        2/7/2024   General Health   How would you rate your overall physical health? Good   Feel stress (tense, anxious, or unable to sleep) Only a little   (!) STRESS CONCERN      2/7/2024   Nutrition   Three or more servings of calcium each day? Yes   Diet: Regular (no restrictions)   How many servings of fruit and vegetables per day? (!) 0-1   How many sweetened beverages each day? 0-1         2/7/2024   Exercise   Days per week of moderate/strenous exercise 2 days   Average minutes spent exercising at this level 20 min   (!) EXERCISE CONCERN      2/7/2024   Social Factors   Frequency of gathering with friends or relatives Twice a week   Worry food won't last until get money to buy more No   Food not last or not have enough money for food? No   Do you have housing?  Yes   Are you worried about losing your housing? No   Lack of transportation? No   Unable to get utilities (heat,electricity)? No         2/7/2024   Dental   Dentist two times every year? Yes         2/7/2024   TB Screening   Were you born outside of [place]?  (!) YES  -[place]         Today's PHQ-2 Score:       2/9/2024     3:17 PM   PHQ-2 ( 1999 [employer])   Q1: Little interest or pleasure in doing things 0   Q2: Feeling down, depressed or hopeless 0   PHQ-2 Score 0   Q1: Little interest or pleasure in doing things Not at all   Q2: Feeling down, depressed or hopeless Not at all   PHQ-2 Score 0           2/7/2024   Substance Use   Alcohol more than 3/day or more than 7/wk No   Do you use any other substances recreationally? No     Social History     Tobacco Use    Smoking status: Never    Smokeless tobacco: Never   Substance Use Topics    Alcohol use: Not Currently     Alcohol/week: 2.0 standard drinks of alcohol     Types: 2 Standard drinks or  equivalent per week     Comment: ocassionally    Drug use: Never             2024   Breast Cancer Screening   Family history of breast, colon, or ovarian cancer? No / Unknown      Mammogram Screening - Patient under 40 years of age: Routine Mammogram Screening not recommended.         2024   STI Screening   New sexual partner(s) since last STI/HIV test? No     History of abnormal Pap smear: NO - age 30- 65 PAP every 3 years recommended        2020     2:37 PM   PAP / HPV   PAP (Historical) NIL            2024   Contraception/Family Planning   Questions about contraception or family planning (!) YES , is in the process of planning for second child        Reviewed and updated as needed this visit by Provider                    Past Medical History:   Diagnosis Date    Frequent headaches     Thyroid disease      Past Surgical History:   Procedure Laterality Date     SECTION       OB History    Para Term  AB Living   1 1 1 0 0 1   SAB IAB Ectopic Multiple Live Births   0 0 0 0 1      # Outcome Date GA Lbr Leon/2nd Weight Sex Delivery Anes PTL Lv   1 Term 21 41w0d 23:53 / 03:51 4.12 kg (9 lb 1.3 oz) M CS-Unspec EPI N DANA      Complications: Failure to Progress in Second Stage, Chorioamnionitis, Labor and delivery complicated by meconium in amniotic fluid      Name: SALLY,BABY BOY      Apgar1: 8  Apgar5: 9     Lab work is in process  Labs reviewed in EPIC  BP Readings from Last 3 Encounters:   24 107/75   23 106/73   22 105/70    Wt Readings from Last 3 Encounters:   24 63.4 kg (139 lb 11.2 oz)   23 67.3 kg (148 lb 4.8 oz)   22 72.8 kg (160 lb 8 oz)                  Patient Active Problem List   Diagnosis    Seasonal allergic rhinitis due to other allergic trigger    Episodic tension-type headache, not intractable    Neck muscle spasm    Family history of thyroid disease    Thyroid function test abnormal    Subserous leiomyoma of uterus     Acquired hypothyroidism    Changing pigmented skin lesion-chin     Past Surgical History:   Procedure Laterality Date     SECTION         Social History     Tobacco Use    Smoking status: Never    Smokeless tobacco: Never   Substance Use Topics    Alcohol use: Not Currently     Alcohol/week: 2.0 standard drinks of alcohol     Types: 2 Standard drinks or equivalent per week     Comment: ocassionally     Family History   Problem Relation Age of Onset    Diabetes Mother     Hypertension Mother     Thyroid Disease Father     Hyperlipidemia Father     Asthma Brother     Chronic Obstructive Pulmonary Disease Brother     Diabetes Paternal Grandmother     Diabetes Maternal Grandmother     Hyperlipidemia Maternal Grandfather     Diabetes Maternal Grandfather     Dementia Maternal Grandfather     Diabetes Paternal Grandfather     Hyperlipidemia Paternal Grandfather     Diabetes Paternal Aunt     Diabetes Paternal Aunt     Heart Disease Paternal Aunt     Obesity Paternal Aunt          Current Outpatient Medications   Medication Sig Dispense Refill    levothyroxine (SYNTHROID/LEVOTHROID) 75 MCG tablet Take 1 tablet (75 mcg) by mouth daily 90 tablet 3     Allergies   Allergen Reactions    Sodium Sulfite Rash    Wine [Alcohol] Hives, Itching and Rash     Recent Labs   Lab Test 23  1618 22  0918 20  1549 20  1100   A1C 5.5  --   --   --    LDL  --   --   --  90   HDL  --   --   --  85   TRIG  --   --   --  52   CR 0.69  --   --   --    GFRESTIMATED >90  --   --   --    POTASSIUM 4.1  --   --   --    TSH 1.54 1.53   < > 6.95*    < > = values in this interval not displayed.      Review of Systems    Review of Systems  CONSTITUTIONAL: NEGATIVE for fever, chills, change in weight  INTEGUMENTARY/SKIN: Recent increase in size, pigmentation, itching and drainage from the raised pigmented skin lesion on the chin  EYES: NEGATIVE for vision changes or irritation  ENT/MOUTH: NEGATIVE for ear, mouth and throat  "problems  RESP: NEGATIVE for significant cough or SOB  BREAST: NEGATIVE for masses, tenderness or discharge  CV: NEGATIVE for chest pain, palpitations or peripheral edema  GI: NEGATIVE for nausea, abdominal pain, heartburn, or change in bowel habits  : NEGATIVE for frequency, dysuria, or hematuria   female: History of subserosal uterine fibroid  MUSCULOSKELETAL: NEGATIVE for significant arthralgias or myalgia  NEURO: NEGATIVE for weakness, dizziness or paresthesias  ENDOCRINE: NEGATIVE for temperature intolerance, skin/hair changes  ENDOCRINE: History of hypothyroidism  HEME: NEGATIVE for bleeding problems  PSYCHIATRIC: NEGATIVE for changes in mood or affect     Objective    Exam  /75 (BP Location: Right arm, Patient Position: Sitting, Cuff Size: Adult Regular)   Pulse 78   Temp 98  F (36.7  C) (Temporal)   Resp 16   Ht 1.549 m (5' 1\")   Wt 63.4 kg (139 lb 11.2 oz)   LMP 02/02/2024 (Exact Date)   SpO2 100%   BMI 26.40 kg/m     Estimated body mass index is 26.4 kg/m  as calculated from the following:    Height as of this encounter: 1.549 m (5' 1\").    Weight as of this encounter: 63.4 kg (139 lb 11.2 oz).    Physical Exam  GENERAL: alert and no distress  EYES: Eyes grossly normal to inspection, PERRL and conjunctivae and sclerae normal  HENT: ear canals and TM's normal, nose and mouth without ulcers or lesions  NECK: no adenopathy, no asymmetry, masses, or scars  RESP: lungs clear to auscultation - no rales, rhonchi or wheezes  CV: regular rate and rhythm, normal S1 S2, no S3 or S4, no murmur, click or rub, no peripheral edema  ABDOMEN: soft, nontender, no hepatosplenomegaly, no masses and bowel sounds normal  MS: no gross musculoskeletal defects noted, no edema  SKIN: About 2 mm, dark black raised pigmented skin lesion with a coarse texture on the top located in the middle of the chin  NEURO: Normal strength and tone, mentation intact and speech normal  PSYCH: mentation appears normal, affect " normal/bright      Signed Electronically by: Terrance Kasper MD

## 2024-02-09 NOTE — PATIENT INSTRUCTIONS
"Learning About Being Physically Active  What is physical activity?     Being physically active means doing any kind of activity that gets your body moving.  The types of physical activity that can help you get fit and stay healthy include:  Aerobic or \"cardio\" activities. These make your heart beat faster and make you breathe harder, such as brisk walking, riding a bike, or running. They strengthen your heart and lungs and build up your endurance.  Strength training activities. These make your muscles work against, or \"resist,\" something. Examples include lifting weights or doing push-ups. These activities help tone and strengthen your muscles and bones.  Stretches. These let you move your joints and muscles through their full range of motion. Stretching helps you be more flexible.  Reaching a balance between these three types of physical activity is important because each one contributes to your overall fitness.  What are the benefits of being active?  Being active is one of the best things you can do for your health. It helps you to:  Feel stronger and have more energy to do all the things you like to do.  Focus better at school or work.  Feel, think, and sleep better.  Reach and stay at a healthy weight.  Lose fat and build lean muscle.  Lower your risk for serious health problems, including diabetes, heart attack, high blood pressure, and some cancers.  Keep your heart, lungs, bones, muscles, and joints strong and healthy.  How can you make being active part of your life?  Start slowly. Make it your long-term goal to get at least 30 minutes of exercise on most days of the week. Walking is a good choice. You also may want to do other activities, such as running, swimming, cycling, or playing tennis or team sports.  Pick activities that you like--ones that make your heart beat faster, your muscles stronger, and your muscles and joints more flexible. If you find more than one thing you like doing, do them all. You " "don't have to do the same thing every day.  Get your heart pumping every day. Any activity that makes your heart beat faster and keeps it at that rate for a while counts.  Here are some great ways to get your heart beating faster:  Go for a brisk walk, run, or hike.  Go for a swim or bike ride.  Take an online exercise class or dance.  Play a game of touch football, basketball, or soccer.  Play tennis, pickleball, or racquetball.  Climb stairs.  Even some household chores can be aerobic. Just do them at a faster pace. Raking or mowing the lawn, sweeping the garage, and vacuuming and cleaning your home all can help get your heart rate up.  Strengthen your muscles during the week. You don't have to lift heavy weights or grow big, bulky muscles to get stronger. Doing a few simple activities that make your muscles work against, or \"resist,\" something can help you get stronger. Aim for at least twice a week.  For example, you can:  Do push-ups or sit-ups, which use your own body weight as resistance.  Lift weights or dumbbells or use stretch bands at home or in a gym or community center.  Stretch your muscles often. Stretching will help you as you become more active. It can help you stay flexible and loosen tight muscles. It can also help improve your balance and posture and can be a great way to relax.  Be sure to stretch the muscles you'll be using when you work out. It's best to warm your muscles slightly before you stretch them. Walk or do some other light aerobic activity for a few minutes. Then start stretching.  When you stretch your muscles:  Do it slowly. Stretching is not about going fast or making sudden movements.  Don't push or bounce during a stretch.  Hold each stretch for at least 15 to 30 seconds, if you can. You should feel a stretch in the muscle, but not pain.  Breathe out as you do the stretch. Then breathe in as you hold the stretch. Don't hold your breath.  If you're worried about how more activity " "might affect your health, have a checkup before you start. Follow any special advice your doctor gives you for getting a smart start.  Where can you learn more?  Go to https://www.[employer].net/patiented  Enter W332 in the search box to learn more about \"Learning About Being Physically Active.\"  Current as of: June 6, 2023               Content Version: 13.8    4240-6519 [employer].   Care instructions adapted under license by your healthcare professional. If you have questions about a medical condition or this instruction, always ask your healthcare professional. [employer] disclaims any warranty or liability for your use of this information.      Eating Healthy Foods: Care Instructions  With every meal, you can make healthy food choices. Try to eat a variety of fruits, vegetables, whole grains, lean proteins, and low-fat dairy products. This can help you get the right balance of nutrients, including vitamins and minerals. Small changes add up over time. You can start by adding one healthy food to your meals each day.    Try to make half your plate fruits and vegetables, one-fourth whole grains, and one-fourth lean proteins. Try including dairy with your meals.   Eat more fruits and vegetables. Try to have them with most meals and snacks.   Foods for healthy eating    Fruits    These can be fresh, frozen, canned, or dried.  Try to choose whole fruit rather than fruit juice.  Eat a variety of colors.    Vegetables    These can be fresh, frozen, canned, or dried.  Beans, peas, and lentils count too.    Whole grains    Choose whole-grain breads, cereals, and noodles.  Try brown rice.    Lean proteins    These can include lean meat, poultry, fish, and eggs.  You can also have tofu, beans, peas, lentils, nuts, and seeds.    Dairy    Try milk, yogurt, and cheese.  Choose low-fat or fat-free when you can.  If you need to, use lactose-free milk or fortified plant-based milk products, such as " "soy milk.    Water    Drink water when you're thirsty.  Limit sugar-sweetened drinks, including soda, fruit drinks, and sports drinks.  Where can you learn more?  Go to https://www.[employer].net/patiented  Enter T756 in the search box to learn more about \"Eating Healthy Foods: Care Instructions.\"  Current as of: February 28, 2023               Content Version: 13.8    1397-0291 [employer].   Care instructions adapted under license by your healthcare professional. If you have questions about a medical condition or this instruction, always ask your healthcare professional. [employer] disclaims any warranty or liability for your use of this information.      Learning About Stress  What is stress?     Stress is your body's response to a hard situation. Your body can have a physical, emotional, or mental response. Stress is a fact of life for most people, and it affects everyone differently. What causes stress for you may not be stressful for someone else.  A lot of things can cause stress. You may feel stress when you go on a job interview, take a test, or run a race. This kind of short-term stress is normal and even useful. It can help you if you need to work hard or react quickly. For example, stress can help you finish an important job on time.  Long-term stress is caused by ongoing stressful situations or events. Examples of long-term stress include long-term health problems, ongoing problems at work, or conflicts in your family. Long-term stress can harm your health.  How does stress affect your health?  When you are stressed, your body responds as though you are in danger. It makes hormones that speed up your heart, make you breathe faster, and give you a burst of energy. This is called the fight-or-flight stress response. If the stress is over quickly, your body goes back to normal and no harm is done.  But if stress happens too often or lasts too long, it can have bad effects. " Long-term stress can make you more likely to get sick, and it can make symptoms of some diseases worse. If you tense up when you are stressed, you may develop neck, shoulder, or low back pain. Stress is linked to high blood pressure and heart disease.  Stress also harms your emotional health. It can make you monk, tense, or depressed. Your relationships may suffer, and you may not do well at work or school.  What can you do to manage stress?  You can try these things to help manage stress:   Do something active. Exercise or activity can help reduce stress. Walking is a great way to get started. Even everyday activities such as housecleaning or yard work can help.  Try yoga or brenda chi. These techniques combine exercise and meditation. You may need some training at first to learn them.  Do something you enjoy. For example, listen to music or go to a movie. Practice your hobby or do volunteer work.  Meditate. This can help you relax, because you are not worrying about what happened before or what may happen in the future.  Do guided imagery. Imagine yourself in any setting that helps you feel calm. You can use online videos, books, or a teacher to guide you.  Do breathing exercises. For example:  From a standing position, bend forward from the waist with your knees slightly bent. Let your arms dangle close to the floor.  Breathe in slowly and deeply as you return to a standing position. Roll up slowly and lift your head last.  Hold your breath for just a few seconds in the standing position.  Breathe out slowly and bend forward from the waist.  Let your feelings out. Talk, laugh, cry, and express anger when you need to. Talking with supportive friends or family, a counselor, or a ruth leader about your feelings is a healthy way to relieve stress. Avoid discussing your feelings with people who make you feel worse.  Write. It may help to write about things that are bothering you. This helps you find out how much stress  "you feel and what is causing it. When you know this, you can find better ways to cope.  What can you do to prevent stress?  You might try some of these things to help prevent stress:  Manage your time. This helps you find time to do the things you want and need to do.  Get enough sleep. Your body recovers from the stresses of the day while you are sleeping.  Get support. Your family, friends, and community can make a difference in how you experience stress.  Limit your news feed. Avoid or limit time on social media or news that may make you feel stressed.  Do something active. Exercise or activity can help reduce stress. Walking is a great way to get started.  Where can you learn more?  Go to https://www.[employer].net/patiented  Enter N032 in the search box to learn more about \"Learning About Stress.\"  Current as of: February 26, 2023               Content Version: 13.8    8531-3418 [employer].   Care instructions adapted under license by your healthcare professional. If you have questions about a medical condition or this instruction, always ask your healthcare professional. [employer] disclaims any warranty or liability for your use of this information.      Preventive Care Advice   This is general advice given by our system to help you stay healthy. However, your care team may have specific advice just for you. Please talk to your care team about your preventive care needs.  Nutrition  Eat 5 or more servings of fruits and vegetables each day.  Try wheat bread, brown rice and whole grain pasta (instead of white bread, rice, and pasta).  Get enough calcium and vitamin D. Check the label on foods and aim for 100% of the RDA (recommended daily allowance).  Lifestyle  Exercise at least 150 minutes each week  (30 minutes a day, 5 days a week).  Do muscle strengthening activities 2 days a week. These help control your weight and prevent disease.  No smoking.  Wear sunscreen to prevent skin " cancer.  Have a dental exam and cleaning every 6 months.  Yearly exams  See your health care team every year to talk about:  Any changes in your health.  Any medicines your care team has prescribed.  Preventive care, family planning, and ways to prevent chronic diseases.  Shots (vaccines)   HPV shots (up to age 26), if you've never had them before.  Hepatitis B shots (up to age 59), if you've never had them before.  COVID-19 shot: Get this shot when it's due.  Flu shot: Get a flu shot every year.  Tetanus shot: Get a tetanus shot every 10 years.  Pneumococcal, hepatitis A, and RSV shots: Ask your care team if you need these based on your risk.  Shingles shot (for age 50 and up)  General health tests  Diabetes screening:  Starting at age 35, Get screened for diabetes at least every 3 years.  If you are younger than age 35, ask your care team if you should be screened for diabetes.  Cholesterol test: At age 39, start having a cholesterol test every 5 years, or more often if advised.  Bone density scan (DEXA): At age 50, ask your care team if you should have this scan for osteoporosis (brittle bones).  Hepatitis C: Get tested at least once in your life.  STIs (sexually transmitted infections)  Before age 24: Ask your care team if you should be screened for STIs.  After age 24: Get screened for STIs if you're at risk. You are at risk for STIs (including HIV) if:  You are sexually active with more than one person.  You don't use condoms every time.  You or a partner was diagnosed with a sexually transmitted infection.  If you are at risk for HIV, ask about PrEP medicine to prevent HIV.  Get tested for HIV at least once in your life, whether you are at risk for HIV or not.  Cancer screening tests  Cervical cancer screening: If you have a cervix, begin getting regular cervical cancer screening tests starting at age 21.  Breast cancer scan (mammogram): If you've ever had breasts, begin having regular mammograms starting at  age 40. This is a scan to check for breast cancer.  Colon cancer screening: It is important to start screening for colon cancer at age 45.  Have a colonoscopy test every 10 years (or more often if you're at risk) Or, ask your provider about stool tests like a FIT test every year or Cologuard test every 3 years.  To learn more about your testing options, visit:   https://www.[employer]/398937.pdf.  For help making a decision, visit:   https://[employer]/qt52420.  Prostate cancer screening test: If you have a prostate, ask your care team if a prostate cancer screening test (PSA) at age 55 is right for you.  Lung cancer screening: If you are a current or former smoker ages 50 to 80, ask your care team if ongoing lung cancer screenings are right for you.  For informational purposes only. Not to replace the advice of your health care provider. Copyright   2023 [facility]. All rights reserved. Clinically reviewed by the [facility] Transitions Program. Eniram 114203 - REV 01/24.

## 2024-02-09 NOTE — NURSING NOTE
Prior to immunization administration, verified patients identity using patient s name and date of birth. Please see Immunization Activity for additional information.     Screening Questionnaire for Adult Immunization    Are you sick today?   No   Do you have allergies to medications, food, a vaccine component or latex?   No   Have you ever had a serious reaction after receiving a vaccination?   No   Do you have a long-term health problem with heart, lung, kidney, or metabolic disease (e.g., diabetes), asthma, a blood disorder, no spleen, complement component deficiency, a cochlear implant, or a spinal fluid leak?  Are you on long-term aspirin therapy?   No   Do you have cancer, leukemia, HIV/AIDS, or any other immune system problem?   No   Do you have a parent, brother, or sister with an immune system problem?   No   In the past 3 months, have you taken medications that affect  your immune system, such as prednisone, other steroids, or anticancer drugs; drugs for the treatment of rheumatoid arthritis, Crohn s disease, or psoriasis; or have you had radiation treatments?   No   Have you had a seizure, or a brain or other nervous system problem?   No   During the past year, have you received a transfusion of blood or blood    products, or been given immune (gamma) globulin or antiviral drug?   No   For women: Are you pregnant or is there a chance you could become       pregnant during the next month?   No   Have you received any vaccinations in the past 4 weeks?   No     Immunization questionnaire answers were all negative.      Patient instructed to remain in clinic for 15 minutes afterwards, and to report any adverse reactions.     Screening performed by Matilda Flores MA on 2/9/2024 at 3:56 PM.

## 2024-02-12 ENCOUNTER — TELEPHONE (OUTPATIENT)
Dept: DERMATOLOGY | Facility: CLINIC | Age: 34
End: 2024-02-12
Payer: COMMERCIAL

## 2024-02-12 NOTE — TELEPHONE ENCOUNTER
This encounter is being sent to inform the clinic that this patient has a referral from Dr. Kasper for the diagnoses of Changing pigmented skin lesion and has requested that this patient be seen within 1 and/or with 2 weeks.  Based on the availability of our provider(s), we are unable to accommodate this request.    Were all sites offered this patient?  Yes    Does scheduling algorithm request to schedule next available?  Patient has been scheduled for the first available opening with Anna Curiel on 02/22/24 in  location .  We have informed the patient that the clinic will review their referral and reach out if a sooner appointment is medically necessary.      Pt canceled Appt. Pt declined to be scheduled with a PA. Pt only will see a MD. Please review and call pt back to discuss. Thanks

## 2024-02-12 NOTE — TELEPHONE ENCOUNTER
Writer scheduled patient for 2/22 in [place], [place]. No further questions or concerns.    Candida Aragon RN

## 2024-02-12 NOTE — TELEPHONE ENCOUNTER
This encounter is being sent to inform the clinic that this patient has a referral from Dr. Kasper for the diagnoses of changing skin lesion and has requested that this patient be seen within 1-2 weeks  and/or with Derm.  Based on the availability of our provider(s), we are unable to accommodate this request.    Were all sites offered this patient?  Yes    Does scheduling algorithm request to schedule next available?  Patient has been scheduled for the first available opening with Anna Curiel on 08/28/2024.  We have informed the patient that the clinic will review their referral and reach out if a sooner appointment is medically necessary.

## 2024-02-13 LAB
BKR LAB AP GYN ADEQUACY: NORMAL
BKR LAB AP GYN INTERPRETATION: NORMAL
BKR LAB AP HPV REFLEX: NORMAL
BKR LAB AP PREVIOUS ABNORMAL: NORMAL
PATH REPORT.COMMENTS IMP SPEC: NORMAL
PATH REPORT.COMMENTS IMP SPEC: NORMAL
PATH REPORT.RELEVANT HX SPEC: NORMAL

## 2024-02-13 NOTE — TELEPHONE ENCOUNTER
I left a VM for patient to call the clinic to schedule an appointment. At this time we can offer 2/16 at 8:30 with Dr. Dixon.     Charity ARAGON

## 2024-02-14 NOTE — PROGRESS NOTES
[facility] Dermatology Note  Encounter Date: Feb 16, 2024  Office Visit     Dermatology Problem List:  1. Dyshidrotic eczema 2021. Resolved.  - Prior Tx: augmented betamethasone, cetirizine (no improvement), OTC hydrocortisone (no improvement)  2. Symptomatic nevus - chin  - Referred to derm surgery 2/16/24    FHx: Negative for skin cancer.  ____________________________________________    Assessment & Plan:    # Lesion on chin consistent. Nevus, symptomatic - chin. Discussed risks and benefits of treatment, including excision. Patient is concerned with scarring. Has had lesion for years but symptomatic  - Photodocumentation taken today.  - Referral to derm surgery for excision evaluation.   - Recommend patient send updated photo of the nevus 1-2 days prior to her virtual consult with derm surgery.    Procedures Performed:   N/A    Follow-up: with derm surgery second opinion    Staff and Scribe:     Scribe Disclosure:   I, Joseline Sandy, am serving as a scribe to document services personally performed by Carlotta Dixon MD based on data collection and the provider's statements to me.     Scribe Disclosure:   I, Ila Vasquez, am serving as a scribe to document services personally performed by Carlotta Dixon MD based on data collection and the provider's statements to me.     Provider Disclosure:   The documentation recorded by the scribe accurately reflects the services I personally performed and the decisions made by me.    Carlotta Dixon MD    Department of Dermatology  [facility] [place]: Phone: [phone], Fax:[phone]  [facility]: Phone: [phone], Fax: [phone]   ____________________________________________    CC: Skin Check (Area of concern: chin, mole that is changing and pus discharge coming out of it.)    HPI:  Ms. Michael Robertson is a(n) 33 year old female who  presents today as a new patient for changing pigmented skin lesion.. Referred by Dr Terrance Kasper    Patient was evaluated by Dr. Kasper for annual physical on 2/9/24. Upon exam, a pigmented lesion on her chin was appreciated. Patient reported that it had been increasing in size, itching, and draining.     Today, she reports the lesion has been present since childhood and that the aforementioned symptoms have persisted. Denies prior dermatology evaluation and personal history of skin cancer.    Patient is otherwise feeling well, without additional skin concerns.    Labs Reviewed:  N/A    Physical Exam:  Vitals: LMP 02/02/2024 (Exact Date)   SKIN: Focused examination of chin was performed.  - pigmented papule on the chin dome shaped. refular  - No other lesions of concern on areas examined.     Medications:  Current Outpatient Medications   Medication    levothyroxine (SYNTHROID/LEVOTHROID) 75 MCG tablet     No current facility-administered medications for this visit.      Past Medical History:   Patient Active Problem List   Diagnosis    Seasonal allergic rhinitis due to other allergic trigger    Episodic tension-type headache, not intractable    Neck muscle spasm    Family history of thyroid disease    Thyroid function test abnormal    Subserous leiomyoma of uterus    Acquired hypothyroidism    Changing pigmented skin lesion-chin     Past Medical History:   Diagnosis Date    Frequent headaches     Thyroid disease 2020        CC No referring provider defined for this encounter. on close of this encounter.

## 2024-02-16 ENCOUNTER — LAB (OUTPATIENT)
Dept: LAB | Facility: CLINIC | Age: 34
End: 2024-02-16
Payer: COMMERCIAL

## 2024-02-16 ENCOUNTER — OFFICE VISIT (OUTPATIENT)
Dept: DERMATOLOGY | Facility: CLINIC | Age: 34
End: 2024-02-16
Payer: COMMERCIAL

## 2024-02-16 DIAGNOSIS — E03.9 ACQUIRED HYPOTHYROIDISM: ICD-10-CM

## 2024-02-16 DIAGNOSIS — D64.9 LOW HEMOGLOBIN: Primary | ICD-10-CM

## 2024-02-16 DIAGNOSIS — Z13.220 SCREENING FOR HYPERLIPIDEMIA: ICD-10-CM

## 2024-02-16 DIAGNOSIS — D22.9 NEVUS: Primary | ICD-10-CM

## 2024-02-16 DIAGNOSIS — Z13.1 SCREENING FOR DIABETES MELLITUS (DM): ICD-10-CM

## 2024-02-16 DIAGNOSIS — Z13.0 SCREENING FOR DEFICIENCY ANEMIA: ICD-10-CM

## 2024-02-16 LAB
CHOLEST SERPL-MCNC: 167 MG/DL
ERYTHROCYTE [DISTWIDTH] IN BLOOD BY AUTOMATED COUNT: 12.8 % (ref 10–15)
FASTING STATUS PATIENT QL REPORTED: YES
FASTING STATUS PATIENT QL REPORTED: YES
FERRITIN SERPL-MCNC: 72 NG/ML (ref 6–175)
GLUCOSE SERPL-MCNC: 98 MG/DL (ref 70–99)
HCT VFR BLD AUTO: 36 % (ref 35–47)
HDLC SERPL-MCNC: 75 MG/DL
HGB BLD-MCNC: 11.5 G/DL (ref 11.7–15.7)
HUMAN PAPILLOMA VIRUS 16 DNA: NEGATIVE
HUMAN PAPILLOMA VIRUS 18 DNA: NEGATIVE
HUMAN PAPILLOMA VIRUS FINAL DIAGNOSIS: NORMAL
HUMAN PAPILLOMA VIRUS OTHER HR: NEGATIVE
IRON BINDING CAPACITY (ROCHE): 314 UG/DL (ref 240–430)
IRON SATN MFR SERPL: 15 % (ref 15–46)
IRON SERPL-MCNC: 46 UG/DL (ref 37–145)
LDLC SERPL CALC-MCNC: 82 MG/DL
MCH RBC QN AUTO: 30.4 PG (ref 26.5–33)
MCHC RBC AUTO-ENTMCNC: 31.9 G/DL (ref 31.5–36.5)
MCV RBC AUTO: 95 FL (ref 78–100)
NONHDLC SERPL-MCNC: 92 MG/DL
PLATELET # BLD AUTO: 260 10E3/UL (ref 150–450)
RBC # BLD AUTO: 3.78 10E6/UL (ref 3.8–5.2)
TRIGL SERPL-MCNC: 48 MG/DL
TSH SERPL DL<=0.005 MIU/L-ACNC: 3.38 UIU/ML (ref 0.3–4.2)
WBC # BLD AUTO: 5.3 10E3/UL (ref 4–11)

## 2024-02-16 PROCEDURE — 87088 URINE BACTERIA CULTURE: CPT | Performed by: FAMILY MEDICINE

## 2024-02-16 PROCEDURE — 82728 ASSAY OF FERRITIN: CPT

## 2024-02-16 PROCEDURE — 83550 IRON BINDING TEST: CPT

## 2024-02-16 PROCEDURE — 36415 COLL VENOUS BLD VENIPUNCTURE: CPT

## 2024-02-16 PROCEDURE — 99203 OFFICE O/P NEW LOW 30 MIN: CPT | Performed by: DERMATOLOGY

## 2024-02-16 PROCEDURE — 80061 LIPID PANEL: CPT

## 2024-02-16 PROCEDURE — 83540 ASSAY OF IRON: CPT

## 2024-02-16 PROCEDURE — 85027 COMPLETE CBC AUTOMATED: CPT

## 2024-02-16 PROCEDURE — 84443 ASSAY THYROID STIM HORMONE: CPT

## 2024-02-16 PROCEDURE — 87086 URINE CULTURE/COLONY COUNT: CPT | Performed by: FAMILY MEDICINE

## 2024-02-16 PROCEDURE — 82947 ASSAY GLUCOSE BLOOD QUANT: CPT

## 2024-02-16 RX ORDER — LEVOTHYROXINE SODIUM 75 UG/1
75 TABLET ORAL DAILY
Qty: 90 TABLET | Refills: 3 | Status: SHIPPED | OUTPATIENT
Start: 2024-02-16

## 2024-02-16 ASSESSMENT — PAIN SCALES - GENERAL: PAINLEVEL: NO PAIN (0)

## 2024-02-16 NOTE — RESULT ENCOUNTER NOTE
Babak Morin,  Your recent labs showed normal results for fasting blood sugar with no concern for diabetes and excellent fasting cholesterol numbers.  Your thyroid functions are in normal range, let us continue with current dose of levothyroxine.  Refills were sent today.  Your hemoglobin is slightly low, But your iron levels are in normal range.  This does not need any further intervention at this time let us continue to monitor,   Let me know if you have any questions. Take care.  Terrance Kasper MD

## 2024-02-16 NOTE — NURSING NOTE
Michael Robertson's chief complaint for this visit includes:  Chief Complaint   Patient presents with    Skin Check     Area of concern: chin, mole that is changing and pus discharge coming out of it.     PCP: No Ref-Primary, Physician    Referring Provider:  No referring provider defined for this encounter.    LMP 02/02/2024 (Exact Date)   No Pain (0)        Allergies   Allergen Reactions    Sodium Sulfite Rash    Wine [Alcohol] Hives, Itching and Rash         Do you need any medication refills at today's visit?    No

## 2024-02-17 LAB
BACTERIA UR CULT: ABNORMAL
BACTERIA UR CULT: ABNORMAL

## 2024-02-19 NOTE — RESULT ENCOUNTER NOTE
Babak Morin,  Your recent labs showed urine exam no concern for infection, no concern for diabetes with normal A1c, normal thyroid functions and normal hemoglobin with no concern for anemia.  I would recommend to continue current dose of levothyroxine, refills were sent today..   Let me know if you have any questions. Take care.  Terrance Kasper MD  
Negative test results for hepatitis C screening, this is good.  
Urine culture showing bacteria that is part of the normal gloria and does not need any antibiotics at this time
no

## 2024-03-07 ENCOUNTER — VIRTUAL VISIT (OUTPATIENT)
Dept: DERMATOLOGY | Facility: CLINIC | Age: 34
End: 2024-03-07
Payer: COMMERCIAL

## 2024-03-07 DIAGNOSIS — D22.9 IRRITATED NEVUS: Primary | ICD-10-CM

## 2024-03-07 PROCEDURE — 99441 PR PHYSICIAN TELEPHONE EVALUATION 5-10 MIN: CPT | Mod: GC | Performed by: DERMATOLOGY

## 2024-03-07 NOTE — NURSING NOTE
Michael Robertson's goals for this visit include:   Chief Complaint   Patient presents with    Consult     discuss lesion on chin, possible surgery Dr Dixon pt       She requests these members of her care team be copied on today's visit information:     PCP: No Ref-Primary, Physician    Referring Provider:  No referring provider defined for this encounter.    LMP 02/02/2024 (Exact Date)     Do you need any medication refills at today's visit?         Victoria Salmeron EMT          Teledermatology Nurse Call Patients:     Are you in the [facility] at the time of the encounter? yes    Today's visit will be billed to you and your insurance.    A teledermatology visit is not as thorough as an in-person visit and the quality of the photograph sent may not be of the same quality as that taken by the dermatology clinic.

## 2024-03-07 NOTE — PROGRESS NOTES
[facility] Dermatology Note  Encounter Date: Mar 7, 2024  Store-and-Forward and Telephone ([phone]). Location of teledermatologist: [facility].  Start time: 1151. End time: 1158.    Dermatology Problem List:  1. Dyshidrotic eczema 2021. Resolved.  - Prior Tx: augmented betamethasone, cetirizine (no improvement), OTC hydrocortisone (no improvement)  2. Symptomatic nevus - chin  - Referred to derm surgery 2/16/24. Patient not interested in excision after consult appt 3/07/24      FHx: Negative for skin cancer.    ____________________________________________    Assessment & Plan:    # Symptomatic nevus, chin.   Discussed risks of options including excision, shave biopsy to flatten lesion (discussed very likely to re-grow), and monitoring. Discussed that with excision, there is chance of scarring, erythema, hyperpigmentation, and low risk of recurrence.   - after discussion, patient decided to hold off on excision for now. Encouraged patient to let us know in the future if she would like to pursue excision. Also encouraged patient to let us know if the lesion continues to grow or starts to change or become asymmetric and we can get her in for re-evaluation.     Procedures Performed:   None    Follow-up: prn for if she decides she wants surgery or if she notices any changes in the lesion     Staff, Fellow, and Scribe:     Nataly Hernandez MD  Micrographic Surgery and Dermatologic Oncology (MSDO) Fellow, PGY-5      Staff Physician Comments:   I saw the photos and evaluated the patient with the fellow (Dr. Nataly Hernandez) and I agree with the assessment and plan. I was present for the key portions of the telephone call.    Dixon Cabrera DO    Department of Dermatology  [facility]: Phone: [phone], Fax:[phone]  [facility]: Phone:  [phone], Fax: [phone]    ____________________________________________    CC: No chief complaint on file.    HPI:  Ms. Michael Robertson is a(n) 33 year old female who presents today as a return patient for consult.    The patient was referred by Dr. Dixon for a symptomatic nevus located on the chin. She has had the nevus for since childhood. The nevus has grown in size, is draining, and is pruritic.    She is here to discuss a possible excision, though she is worried about scarring.    Patient reports the mole has been growing and gets irritated. Has bleed before but not recently.     Labs Reviewed:  N/A    Physical Exam:  Vitals: LMP 02/02/2024 (Exact Date)   SKIN: Teledermatology photos were reviewed; image quality and interpretability: acceptable.   - roughly 6 mm dark brown papule on central chin   - No other lesions of concern on areas examined.       Medications:  Current Outpatient Medications   Medication    levothyroxine (SYNTHROID/LEVOTHROID) 75 MCG tablet     No current facility-administered medications for this visit.      Past Medical History:   Patient Active Problem List   Diagnosis    Seasonal allergic rhinitis due to other allergic trigger    Episodic tension-type headache, not intractable    Neck muscle spasm    Family history of thyroid disease    Thyroid function test abnormal    Subserous leiomyoma of uterus    Acquired hypothyroidism    Changing pigmented skin lesion-chin     Past Medical History:   Diagnosis Date    Frequent headaches     Thyroid disease 2020

## 2024-03-07 NOTE — LETTER
3/7/2024         RE: Michael Robertson  [address]  [place] [facility] [address]        Dear Colleague,    Thank you for referring your patient, Michael Robertson, to the [facility]. Please see a copy of my visit note below.    [facility] Dermatology Note  Encounter Date: Mar 7, 2024  Store-and-Forward and Telephone ([phone]). Location of teledermatologist: [facility].  Start time: 1151. End time: 1158.    Dermatology Problem List:  1. Dyshidrotic eczema 2021. Resolved.  - Prior Tx: augmented betamethasone, cetirizine (no improvement), OTC hydrocortisone (no improvement)  2. Symptomatic nevus - chin  - Referred to derm surgery 2/16/24. Patient not interested in excision after consult appt 3/07/24      FHx: Negative for skin cancer.    ____________________________________________    Assessment & Plan:    # Symptomatic nevus, chin.   Discussed risks of options including excision, shave biopsy to flatten lesion (discussed very likely to re-grow), and monitoring. Discussed that with excision, there is chance of scarring, erythema, hyperpigmentation, and low risk of recurrence.   - after discussion, patient decided to hold off on excision for now. Encouraged patient to let us know in the future if she would like to pursue excision. Also encouraged patient to let us know if the lesion continues to grow or starts to change or become asymmetric and we can get her in for re-evaluation.     Procedures Performed:   None    Follow-up: prn for if she decides she wants surgery or if she notices any changes in the lesion     Staff, Fellow, and Scribe:     Nataly Hernandez MD  Micrographic Surgery and Dermatologic Oncology (MSDO) Fellow, PGY-5      Staff Physician Comments:   I saw the photos and evaluated the patient with the fellow (Dr. Nataly Hernandez) and I agree with the assessment and plan. I was present for the key portions of the  telephone call.    Dixon Cabrera DO    Department of Dermatology  [facility]: Phone: [phone], Fax:[phone]  [facility]: Phone: [phone], Fax: [phone]    ____________________________________________    CC: No chief complaint on file.    HPI:  Ms. Michael Robertson is a(n) 33 year old female who presents today as a return patient for consult.    The patient was referred by Dr. Dixon for a symptomatic nevus located on the chin. She has had the nevus for since childhood. The nevus has grown in size, is draining, and is pruritic.    She is here to discuss a possible excision, though she is worried about scarring.    Patient reports the mole has been growing and gets irritated. Has bleed before but not recently.     Labs Reviewed:  N/A    Physical Exam:  Vitals: LMP 02/02/2024 (Exact Date)   SKIN: Teledermatology photos were reviewed; image quality and interpretability: acceptable.   - roughly 6 mm dark brown papule on central chin   - No other lesions of concern on areas examined.       Medications:  Current Outpatient Medications   Medication     levothyroxine (SYNTHROID/LEVOTHROID) 75 MCG tablet     No current facility-administered medications for this visit.      Past Medical History:   Patient Active Problem List   Diagnosis     Seasonal allergic rhinitis due to other allergic trigger     Episodic tension-type headache, not intractable     Neck muscle spasm     Family history of thyroid disease     Thyroid function test abnormal     Subserous leiomyoma of uterus     Acquired hypothyroidism     Changing pigmented skin lesion-chin     Past Medical History:   Diagnosis Date     Frequent headaches      Thyroid disease 2020            Again, thank you for allowing me to participate in the care of your patient.        Sincerely,        Dixon Cabrera MD

## 2024-09-23 ENCOUNTER — MYC REFILL (OUTPATIENT)
Dept: FAMILY MEDICINE | Facility: CLINIC | Age: 34
End: 2024-09-23
Payer: COMMERCIAL

## 2024-09-23 DIAGNOSIS — R94.6 THYROID FUNCTION TEST ABNORMAL: ICD-10-CM

## 2024-09-23 RX ORDER — LEVOTHYROXINE SODIUM 75 UG/1
75 TABLET ORAL DAILY
Qty: 90 TABLET | Refills: 3 | OUTPATIENT
Start: 2024-09-23

## 2024-11-25 ENCOUNTER — NURSE TRIAGE (OUTPATIENT)
Dept: FAMILY MEDICINE | Facility: CLINIC | Age: 34
End: 2024-11-25
Payer: COMMERCIAL

## 2024-11-25 NOTE — TELEPHONE ENCOUNTER
Babak Kasper,   Hope you ve been well. I m writing to you since I m having aches on my lower left abdomen and back since a few days and I m wondering if it s anything to do with my fibroid that was detected early this year. I ve also been constipated lately and was concerned my fibroid has grown. Please let me know if this requires a visit or a scan.   Looking forward to hearing from you.      Thanks,   Patience Morin,     Thank you for your message. If you had to rate your aches in your abdomen and back, would you say they are mild, moderate or severe? Have you been taking anything for the pain? Also, have you tried taking anything for the constipation?      Thank you,     Lori JIMÉNEZ, RN, BSN  [facility]     My ache is moderate. I ve been having a bad cold for past 5 days with a slight fever on the first two days. I ve had cold and fever atleast once  the last few months and I was Covid positive in September.   I ve been taking meds for my constipation and cold. It seems to be helping.     Good mae Rodriguez,    We will be calling you to triage your Servoy message. If we have not reached you, upon receipt of this message, please call [phone] and follow the prompts to speak with a [place] Nurse 24/7.      Based on your described symptoms, we cannot adequately or safely triage your concerns or advise via Servoy.     Servoy messages are not appropriate for discussion of potentially urgent/emergent or immediate symptoms.    [employer] is only intended for questions or concerns that can wait up to 1-2 business days for a response.      If you may be experiencing a medical emergency, please call [phone].     Urgent Care:  Urgent Care is available Monday - Friday at [facility] from 10 am - 8 pm, and 9 am - 8 pm on Saturday and Sunday.     Urgent Care is available Monday - Friday at [facility] from 10 am - 8 pm, and 9 am - 5 pm on Saturday and  Sunday.     Let us know if we can be of any further support. Thank you and have a great day.       Natasha JJ RN  Triage Nurse     [facility] hours: Monday - Friday 10:00 am - 8:00 p.m. / Sat/Sun 9:00 a.m.-8:00 p.m.  [facility] hours: Monday - Friday 10:00 am  - 8:00 pm / Sat/Sun 9:00 a.m.-5:00 p.m.     [facility] ([facility]) 24 hour nurse line available by calling [phone] (clinic phone number)  Reason for Disposition    MILD TO MODERATE constant pain lasting > 2 hours    Additional Information    Negative: Passed out (e.g., fainted, lost consciousness, blacked out and was not responding)    Negative: Shock suspected (e.g., cold/pale/clammy skin, too weak to stand, low BP, rapid pulse)    Negative: Sounds like a life-threatening emergency to the triager    Negative: SEVERE abdominal pain (e.g., excruciating)    Negative: Vomiting red blood or black (coffee ground) material    Negative: Blood in bowel movements  (Exception: Blood on surface of BM with constipation.)    Negative: Black or tarry bowel movements  (Exception: Chronic-unchanged black-grey BMs AND is taking iron pills or Pepto-Bismol.)    Protocols used: Abdominal Pain - Female-A-[place]

## 2024-11-25 NOTE — TELEPHONE ENCOUNTER
Patient reports that she has been having left lower pain that goes into her back. The pain does radiate into her thigh. She reports that she usually has a BM daily and last Thursday and Friday she did not have any BMs. Friday she took psyllium husk and that did not help much. Saturday she took Miralax and that helped. She has been able to have daily BMs.  Since Thursday she has had a cold and fever. The fever has subsided and was only for 2 days. The abdominal pain has been going on for 3 days. The abdominal pain comes and goes and she currently has it. There is no pattern to the pain. She currently has abdominal pain rated (3/10) and this has lasted for about 2.5 hours.   She also has lower left back pain. That has been going for about 2.5 days and she currently has the pain (3/10).    Denies: fever, nausea, vomiting, dysuria, trouble urinating,     Nursing advice: Due to her current symptoms she is to be assessed at the [facility] now. Patient verbalized good understanding, agrees with plan and needs no further support.  Thank you. Natasha Antunez R.N.